# Patient Record
Sex: MALE
[De-identification: names, ages, dates, MRNs, and addresses within clinical notes are randomized per-mention and may not be internally consistent; named-entity substitution may affect disease eponyms.]

---

## 2022-09-22 ENCOUNTER — HOSPITAL ENCOUNTER (INPATIENT)
Dept: HOSPITAL 5 - ED | Age: 57
LOS: 3 days | Discharge: HOME | DRG: 247 | End: 2022-09-25
Attending: INTERNAL MEDICINE | Admitting: INTERNAL MEDICINE
Payer: SELF-PAY

## 2022-09-22 DIAGNOSIS — I21.3: Primary | ICD-10-CM

## 2022-09-22 DIAGNOSIS — D75.1: ICD-10-CM

## 2022-09-22 DIAGNOSIS — E66.01: ICD-10-CM

## 2022-09-22 DIAGNOSIS — F17.210: ICD-10-CM

## 2022-09-22 DIAGNOSIS — R31.9: ICD-10-CM

## 2022-09-22 DIAGNOSIS — E66.9: ICD-10-CM

## 2022-09-22 DIAGNOSIS — E11.65: ICD-10-CM

## 2022-09-22 LAB
ALBUMIN SERPL-MCNC: 4.7 G/DL (ref 3.9–5)
ALT SERPL-CCNC: 44 UNITS/L (ref 7–56)
BASOPHILS # (AUTO): 0.1 K/MM3 (ref 0–0.1)
BASOPHILS NFR BLD AUTO: 1 % (ref 0–1.8)
BUN SERPL-MCNC: 19 MG/DL (ref 9–20)
BUN/CREAT SERPL: 17 %
CALCIUM SERPL-MCNC: 10.2 MG/DL (ref 8.4–10.2)
CRP SERPL-MCNC: 0.4 MG/DL (ref 0–1.3)
EOSINOPHIL # BLD AUTO: 0.2 K/MM3 (ref 0–0.4)
EOSINOPHIL NFR BLD AUTO: 1.8 % (ref 0–4.3)
HCT VFR BLD CALC: 47 % (ref 35.5–45.6)
HEMOLYSIS INDEX: 7
HGB BLD-MCNC: 16.9 GM/DL (ref 11.8–15.2)
INR PPP: 1.11 (ref 0.87–1.13)
LYMPHOCYTES # BLD AUTO: 3.2 K/MM3 (ref 1.2–5.4)
LYMPHOCYTES NFR BLD AUTO: 27.6 % (ref 13.4–35)
MCHC RBC AUTO-ENTMCNC: 36 % (ref 32–34)
MCV RBC AUTO: 92 FL (ref 84–94)
MONOCYTES # (AUTO): 1.1 K/MM3 (ref 0–0.8)
MONOCYTES % (AUTO): 9.7 % (ref 0–7.3)
PLATELET # BLD: 207 K/MM3 (ref 140–440)
RBC # BLD AUTO: 5.1 M/MM3 (ref 3.65–5.03)

## 2022-09-22 PROCEDURE — 36415 COLL VENOUS BLD VENIPUNCTURE: CPT

## 2022-09-22 PROCEDURE — 80061 LIPID PANEL: CPT

## 2022-09-22 PROCEDURE — C1894 INTRO/SHEATH, NON-LASER: HCPCS

## 2022-09-22 PROCEDURE — 82550 ASSAY OF CK (CPK): CPT

## 2022-09-22 PROCEDURE — 4A023N7 MEASUREMENT OF CARDIAC SAMPLING AND PRESSURE, LEFT HEART, PERCUTANEOUS APPROACH: ICD-10-PCS | Performed by: INTERNAL MEDICINE

## 2022-09-22 PROCEDURE — 93005 ELECTROCARDIOGRAM TRACING: CPT

## 2022-09-22 PROCEDURE — 93306 TTE W/DOPPLER COMPLETE: CPT

## 2022-09-22 PROCEDURE — 74176 CT ABD & PELVIS W/O CONTRAST: CPT

## 2022-09-22 PROCEDURE — 83735 ASSAY OF MAGNESIUM: CPT

## 2022-09-22 PROCEDURE — C1760 CLOSURE DEV, VASC: HCPCS

## 2022-09-22 PROCEDURE — 85014 HEMATOCRIT: CPT

## 2022-09-22 PROCEDURE — 85610 PROTHROMBIN TIME: CPT

## 2022-09-22 PROCEDURE — 96374 THER/PROPH/DIAG INJ IV PUSH: CPT

## 2022-09-22 PROCEDURE — 83690 ASSAY OF LIPASE: CPT

## 2022-09-22 PROCEDURE — C1725 CATH, TRANSLUMIN NON-LASER: HCPCS

## 2022-09-22 PROCEDURE — C8929 TTE W OR WO FOL WCON,DOPPLER: HCPCS

## 2022-09-22 PROCEDURE — 81001 URINALYSIS AUTO W/SCOPE: CPT

## 2022-09-22 PROCEDURE — 02C03ZZ EXTIRPATION OF MATTER FROM CORONARY ARTERY, ONE ARTERY, PERCUTANEOUS APPROACH: ICD-10-PCS | Performed by: INTERNAL MEDICINE

## 2022-09-22 PROCEDURE — C1757 CATH, THROMBECTOMY/EMBOLECT: HCPCS

## 2022-09-22 PROCEDURE — 80307 DRUG TEST PRSMV CHEM ANLYZR: CPT

## 2022-09-22 PROCEDURE — 84132 ASSAY OF SERUM POTASSIUM: CPT

## 2022-09-22 PROCEDURE — C9606 PERC D-E COR REVASC W AMI S: HCPCS

## 2022-09-22 PROCEDURE — 85027 COMPLETE CBC AUTOMATED: CPT

## 2022-09-22 PROCEDURE — 84100 ASSAY OF PHOSPHORUS: CPT

## 2022-09-22 PROCEDURE — 86140 C-REACTIVE PROTEIN: CPT

## 2022-09-22 PROCEDURE — C1874 STENT, COATED/COV W/DEL SYS: HCPCS

## 2022-09-22 PROCEDURE — C1769 GUIDE WIRE: HCPCS

## 2022-09-22 PROCEDURE — 85025 COMPLETE CBC W/AUTO DIFF WBC: CPT

## 2022-09-22 PROCEDURE — 027135Z DILATION OF CORONARY ARTERY, TWO ARTERIES WITH TWO DRUG-ELUTING INTRALUMINAL DEVICES, PERCUTANEOUS APPROACH: ICD-10-PCS | Performed by: INTERNAL MEDICINE

## 2022-09-22 PROCEDURE — B2111ZZ FLUOROSCOPY OF MULTIPLE CORONARY ARTERIES USING LOW OSMOLAR CONTRAST: ICD-10-PCS | Performed by: INTERNAL MEDICINE

## 2022-09-22 PROCEDURE — 92941 PRQ TRLML REVSC TOT OCCL AMI: CPT

## 2022-09-22 PROCEDURE — 84484 ASSAY OF TROPONIN QUANT: CPT

## 2022-09-22 PROCEDURE — 93454 CORONARY ARTERY ANGIO S&I: CPT

## 2022-09-22 PROCEDURE — 82962 GLUCOSE BLOOD TEST: CPT

## 2022-09-22 PROCEDURE — 99285 EMERGENCY DEPT VISIT HI MDM: CPT

## 2022-09-22 PROCEDURE — 80048 BASIC METABOLIC PNL TOTAL CA: CPT

## 2022-09-22 PROCEDURE — 85018 HEMOGLOBIN: CPT

## 2022-09-22 PROCEDURE — 71045 X-RAY EXAM CHEST 1 VIEW: CPT

## 2022-09-22 PROCEDURE — 99406 BEHAV CHNG SMOKING 3-10 MIN: CPT

## 2022-09-22 PROCEDURE — 85347 COAGULATION TIME ACTIVATED: CPT

## 2022-09-22 PROCEDURE — 80053 COMPREHEN METABOLIC PANEL: CPT

## 2022-09-22 PROCEDURE — 83036 HEMOGLOBIN GLYCOSYLATED A1C: CPT

## 2022-09-22 PROCEDURE — 83880 ASSAY OF NATRIURETIC PEPTIDE: CPT

## 2022-09-22 PROCEDURE — C1887 CATHETER, GUIDING: HCPCS

## 2022-09-22 RX ADMIN — TICAGRELOR SCH MG: 90 TABLET ORAL at 21:40

## 2022-09-22 RX ADMIN — METOPROLOL TARTRATE SCH MG: 50 TABLET, FILM COATED ORAL at 21:42

## 2022-09-22 RX ADMIN — HEPARIN SODIUM ONE UNIT: 1000 INJECTION, SOLUTION INTRAVENOUS; SUBCUTANEOUS at 18:00

## 2022-09-22 RX ADMIN — FAMOTIDINE SCH MG: 20 TABLET ORAL at 21:40

## 2022-09-22 RX ADMIN — HEPARIN SODIUM ONE UNIT: 1000 INJECTION, SOLUTION INTRAVENOUS; SUBCUTANEOUS at 17:50

## 2022-09-22 NOTE — HISTORY AND PHYSICAL REPORT
History of Present Illness


Date of examination: 22


Date of admission: 


2022


Chief complaint: 





Severe chest pain for 1 hour


History of present illness: 


57-year-old  male with no significant past medical history comes into 

the emergency room for severe retrosternal chest pain with radiation to the left

arm.  Associated with nausea and diaphoresis.  In the emergency room patient 

passed out.  EKG showed acute inferior wall STEMI.  Code STEMI was activated and

patient was sent to the Cath Lab immediately.  Formerly [facility], [facility] was informed .  

Patient had cardiac cath immediately and angioplasty.  Cardiac cath found 

occlusion of the right coronary artery and its proximal segment.  Stenting was 

performed and reestablished CHANDU-3 flow.  Patient has never seen a PCP in the 

last 10 years.  No significant past medical history.  Heavy smoker.  Does not 

want NicoDerm patch.  Patient being admitted to ICU for further care.  Stable 

post.  Asymptomatic after cardiac cath and angioplasty.  Patient apparently had 

arrhythmias and was defibrillated once in the Cath Lab.


Patient initiated on Aggrastat aspirin IV fluids ACE inhibitor's and beta-

blockers.











Heart Score





- HEART Score


History: Highly suspicious


EKG: Significant ST-depression


Age: 45-65


Risk factors: 1-2 risk factors


Troponin: < normal limit


HEART Score: 6











Past History


Past Medical History: No medical history


Past Surgical History: No surgical history


Social history: lives with family, smoking, full code, other (Alcohol socially.)


Family history: hypertension





Medications and Allergies


                                    Allergies











Allergy/AdvReac Type Severity Reaction Status Date / Time


 


No Known Allergies Allergy   Verified 22 17:45











                                Home Medications











 Medication  Instructions  Recorded  Confirmed  Last Taken  Type


 


No Known Home Medications [No  22 Unknown History





Reported Home Medications]     











Active Meds: 


Active Medications





Sodium Chloride (Nacl 0.9% 1000 Ml)  1,000 mls @ 999 mls/hr IV BOLUS ONE


   Stop: 22 18:05


   Last Admin: 22 17:17 Dose:  999 mls/hr


   


NORepinephrine/NS 8 MG-250 ML (Norepinephrine/Ns 8 Mg-250 Ml (Double Conc))  8 

mg in 250 mls @ 23.814 mls/hr IV TITRATE MAURICE; Protocol


   Last Admin: 22 17:23 Dose:  0.2 mcg/kg/min, 47.627 mls/hr


   











Review of Systems


All systems: negative


Cardiovascular: chest pain, syncope





Exam





- Constitutional


Vitals: 


                                        











Temp Pulse Resp BP Pulse Ox


 


    56 L  21   98/61    


 


    22 17:26  22 17:26  22 17:26   











General appearance: Present: mild distress, well-nourished





- EENT


Eyes: Present: PERRL


ENT: hearing intact, clear oral mucosa





- Neck


Neck: Present: supple, normal ROM





- Respiratory


Respiratory effort: normal


Respiratory: bilateral: CTA





- Cardiovascular


Heart rate: 78


Rhythm: regular


Heart Sounds: Present: S1 & S2.  Absent: rub, click





- Extremities


Extremities: no ischemia, pulses intact, pulses symmetrical, No edema


Peripheral Pulses: within normal limits





- Abdominal


General gastrointestinal: Present: soft, non-tender, non-distended, normal bowel

sounds


Male genitourinary: Present: normal





- Integumentary


Integumentary: Present: clear, warm, dry





- Musculoskeletal


Musculoskeletal: gait normal, strength equal bilaterally





- Psychiatric


Psychiatric: appropriate mood/affect, intact judgment & insight





- Neurologic


Neurologic: CNII-XII intact, moves all extremities





- Allied Health


Allied health notes reviewed: nursing, case management





HEART Score





- HEART Score


EKG: Significant ST-depression


Age: 45-65


Risk factors: 1-2 risk factors


Troponin: 


                                        











Troponin T  < 0.010 ng/mL (0.00-0.029)   22  16:59    











Troponin: < normal limit





- Critical Actions


Critical Actions: 4-6 pts:12-16.6% risk of adverse cardiac event. Should be ad

mitted





Results





- Labs


CBC & Chem 7: 


                                 22 04:15





                                 22 04:00


Labs: 


                             Laboratory Last Values











WBC  11.4 K/mm3 (4.5-11.0)  H  22  16:59    


 


RBC  5.10 M/mm3 (3.65-5.03)  H  22  16:59    


 


Hgb  16.9 gm/dl (11.8-15.2)  H  22  16:59    


 


Hct  47.0 % (35.5-45.6)  H  22  16:59    


 


MCV  92 fl (84-94)   22  16:59    


 


MCH  33 pg (28-32)  H  22  16:59    


 


MCHC  36 % (32-34)  H  22  16:59    


 


RDW  12.9 % (13.2-15.2)  L  22  16:59    


 


Plt Count  207 K/mm3 (140-440)   22  16:59    


 


Lymph % (Auto)  27.6 % (13.4-35.0)   22  16:59    


 


Mono % (Auto)  9.7 % (0.0-7.3)  H  22  16:59    


 


Eos % (Auto)  1.8 % (0.0-4.3)   22  16:59    


 


Baso % (Auto)  1.0 % (0.0-1.8)   22  16:59    


 


Lymph # (Auto)  3.2 K/mm3 (1.2-5.4)   22  16:59    


 


Mono # (Auto)  1.1 K/mm3 (0.0-0.8)  H  22  16:59    


 


Eos # (Auto)  0.2 K/mm3 (0.0-0.4)   22  16:59    


 


Baso # (Auto)  0.1 K/mm3 (0.0-0.1)   22  16:59    


 


Seg Neutrophils %  59.9 % (40.0-70.0)   22  16:59    


 


Seg Neutrophils #  6.8 K/mm3 (1.8-7.7)   22  16:59    


 


Sodium  139 mmol/L (137-145)   22  16:59    


 


Potassium  4.1 mmol/L (3.6-5.0)   22  16:59    


 


Chloride  104.1 mmol/L ()   22  16:59    


 


Carbon Dioxide  18 mmol/L (22-30)  L  22  16:59    


 


Anion Gap  21 mmol/L  22  16:59    


 


BUN  19 mg/dL (9-20)   22  16:59    


 


Creatinine  1.1 mg/dL (0.8-1.3)   22  16:59    


 


Estimated GFR  > 60 ml/min  22  16:59    


 


BUN/Creatinine Ratio  17 %  22  16:59    


 


Glucose  218 mg/dL ()  H  22  16:59    


 


POC Glucose  201 mg/dL ()  H  22  16:56    


 


Calcium  10.2 mg/dL (8.4-10.2)   22  16:59    


 


Total Bilirubin  0.90 mg/dL (0.1-1.2)   22  16:59    


 


AST  42 units/L (5-40)  H  22  16:59    


 


ALT  44 units/L (7-56)   22  16:59    


 


Alkaline Phosphatase  93 units/L ()   22  16:59    


 


Total Creatine Kinase  214 units/L ()  H  22  16:59    


 


Troponin T  < 0.010 ng/mL (0.00-0.029)   22  16:59    


 


C-Reactive Protein  0.40 mg/dL (0.00-1.30)   22  16:59    


 


NT-Pro-B Natriuret Pep  57.84 pg/mL (0-900)   22  16:59    


 


Total Protein  7.0 g/dL (6.3-8.2)   22  16:59    


 


Albumin  4.7 g/dL (3.9-5)   22  16:59    


 


Albumin/Globulin Ratio  2.0 %  22  16:59    


 


Lipase  53 units/L (13-60)   22  16:59    








                                    Short CBC











  22 Range/Units





  16:59 04:15 


 


WBC  11.4 H  11.8 H  (4.5-11.0)  K/mm3


 


Hgb  16.9 H  14.8  (11.8-15.2)  gm/dl


 


Hct  47.0 H  42.3  (35.5-45.6)  %


 


Plt Count  207  166  (140-440)  K/mm3








                                       BMP











  22





  16:59 04:00


 


Sodium  139 


 


Potassium  4.1  4.4


 


Chloride  104.1 


 


Carbon Dioxide  18 L 


 


BUN  19 


 


Creatinine  1.1 


 


Glucose  218 H 


 


Calcium  10.2 








                                 Cardiac Enzymes











  22 Range/Units





  16:59 16:59 


 


Total Creatine Kinase   214 H  ()  units/L


 


Troponin T  < 0.010   (0.00-0.029)  ng/mL








                                 Liver Function











  22 Range/Units





  16:59 


 


Total Bilirubin  0.90  (0.1-1.2)  mg/dL


 


AST  42 H  (5-40)  units/L


 


ALT  44  (7-56)  units/L


 


Alkaline Phosphatase  93  ()  units/L


 


Albumin  4.7  (3.9-5)  g/dL














- Imaging and Cardiology


EKG: report reviewed (ST elevation MI in inferior leads.  Heart rate of 76)


Chest x-ray: report reviewed (Mild interstitial pulmonary edema)





Assessment and Plan


Assessment and plan: 


Critical care statement


The high probability OF a clinically significant sudden or life-threatening 

deterioration of the cardiorespiratory system and endocrine system required my 

full and direct attention, intervention and postoperative management.  The 

aggregate critical care time was 40 minutes.  The time is in addition to time 

spent performing reported procedures but includes the followin: Data review and interpretation


2: Patient assessment and monitoring of vital signs


3: Documentation


4:: Medication orders and management





Advance Directives: Yes (Full code)


VTE prophylaxis?: Chemical


Plan of care discussed with patient/family: Yes





- Patient Problems


(1) STEMI (ST elevation myocardial infarction)


Current Visit: Yes   Status: Acute   


Qualifiers: 


   Involved coronary artery: right coronary artery   Qualified Code(s): I21.11 -

ST elevation (STEMI) myocardial infarction involving right coronary artery   


Plan to address problem: 


Patient had angioplasty and stent placed.


Post stent patient doing well.


Patient on Aggrastat, IV fluids, aspirin, lisinopril and metoprolol.


Admission to ICU.








(2) Syncope and collapse


Current Visit: Yes   Status: Acute   


Plan to address problem: 


Secondary to acute MI


Carotid duplex scan requested


Patient also due for echocardiogram for ejection fraction, wall motion 

abnormalities and valvular abnormalities








(3) Obesity (BMI 35.0-39.9 without comorbidity)


Current Visit: Yes   Status: Chronic   


Plan to address problem: 


Patient counseled





Primary team to give a referral to bariatric surgery at the time of discharge


Also lifestyle changes








(4) Polycythemia due to fall in plasma volume


Current Visit: Yes   Status: Acute   


Plan to address problem: 


IV fluids for now








(5) Hyperglycemia


Current Visit: Yes   Status: Acute   


Plan to address problem: 


Patient may have type 2 diabetes


Accu-Cheks and coverage


A1c to be checked








(6) Nicotine dependence


Current Visit: Yes   Status: Chronic   


Qualifiers: 


   Nicotine product type: cigarettes 


Plan to address problem: 


Patient was counseled about cigarette smoking cessation


NicoDerm patches offered--- refused








(7) DVT prophylaxis


Current Visit: Yes   Status: Acute   


Plan to address problem: 


On SCDs and GI prophylaxis

## 2022-09-22 NOTE — CARDIAC CATHERIZATION REPORT
DATE OF SERVICE: 09/22/2022



CARDIAC CATHETERIZATION AND CORONARY ANGIOPLASTY REPORT



REASON FOR PROCEDURE:  The patient is a 57-year-old man who presented to the 

hospital with chest pain, ECG consistent with an acute inferior ST elevation 

myocardial infarction.  Emergency cardiac catheterization protocol was 

activated.



PROCEDURES:

1.  Left heart catheterization.

2.  Selective left and right coronary angiography.

3.  Coronary angioplasty and stenting of the right coronary artery.

4.  Sedation time.



DESCRIPTION OF PROCEDURE:  The patient was prepped and draped in a sterile 

fashion under emergency protocol.  The right femoral artery was entered using 

Seldinger technique followed by placement of a 6-Austrian sheath.  Angiography of 

the left coronary artery was performed using a #4 left Nolberto catheter.  We 

then exchanged for a #4 right Nolberto guiding catheter and advanced to the right

coronary ostium.  Right coronary angiography was performed.  The angiograms were

reviewed.



CORONARY ANGIOGRAPHY:  The left main coronary artery contained a 10% narrowing 

in its distal segment with a suggestion of vessel calcification and plaque in 

this segment.



Following this, there was diffuse atherosclerosis of a relatively small caliber 

LAD and diagonal system.  There was a 40-50% luminal stenosis of the mid LAD.  

Before that, a prominent, first diagonal branch contained a 30% stenosis in its 

proximal segment.



The circumflex artery contained mild irregularities in its proximal AV groove 

segment.  The large terminal obtuse marginal contained a long 50% stenosis.



The right coronary artery was a large caliber dominant vessel.  This vessel was 

occluded in its proximal segment.  This was the infarct related lesion.



CORONARY ANGIOPLASTY:  We proceeded with immediate primary intervention to the 

right coronary artery occlusion.  We used a 0.014 inch  50 guidewire, 

successfully penetrating the completely occluded vessel, following wire 

placement in the distal vessel and the vessel was predilated using a 3.5 mm 

balloon catheter.



Predilatation reestablished CHANDU 2-3 flow, revealing an ulcerated plaque at the 

primary lesion and a long segment of intracoronary thrombus within the mid and 

distal segments of the vessel.



We then deployed 2 sequential 5.0 mm drug-eluting stents in the proximal to mid 

vessel covering the entire primary lesion.  Following stenting, CHANDU 3 flow was 

reestablished, with zero residual stenosis at the primary lesion.  There was 

still evidence of significant filling defect beyond the primary lesion 

consistent with extensive intracoronary thrombus.



We then obtained an [place] catheter, which was transitioned into the vessel and 

performed 3 passes with successful removal of an extensive amount of 

intraluminal thrombus.  Following [place] catheter thrombectomy, there was no 

residual thrombus evident on subsequent angiograms.  Instead, we then discovered

2 sequential 70-80% secondary lesions in the distal vessel, located between the 

origin of the acute margin and the right posterior descending branch.



We then proceeded with additional primary stenting of the secondary lesions in 

the distal vessel this time using 2 sequential overlapping 4.0 mm drug-eluting 

stents to cover the longer lesional segment.  Following the additional stenting,

there was an excellent angiographic result at the secondary site, zero residual 

stenosis and CHANDU 3 flow was maintained down the vessel.



Transient ventricular fibrillation:  After the initial balloon inflation on 

restoration of CHANDU-3 flow, the patient demonstrated ventricular fibrillation, 

which was promptly terminated using 200 joules of cardioversion.  He was also 

given 300 mg intravenous amiodarone, with no further ventricular ectopy or 

malignant tachyarrhythmia.



Following the procedure, patient was chest pain free, hemodynamically stable in 

a stable sinus rhythm, ST segments inferiorly were down by more than 50% from 

the original baseline, and the right femoral arterial sheath was removed and 

hemostasis achieved successfully using an Angio-Seal device.  The patient was 

then returned to the postprocedure unit in stable condition to be admitted to 

the CCU.



CONCLUSION:

1.  The patient admitted with an acute inferolateral ST elevation myocardial 

infarction.

2.  Emergency cardiac catheterization protocol was activated.

3.  A 100% occlusion of the proximal right coronary artery was infarct related 

lesion.

4.  Successful primary angioplasty and stenting of the right coronary artery 

with deployment of 5.0 mm drug-eluting stents at the primary lesion, an 

additional 4.0 mm drug-eluting stents deployed to secondary lesions in the 

distal AV groove vessel.

5.  [place] catheter thrombectomy was done successfully to reduce the burden of 

extensive intraluminal thrombus.



RECOMMENDATIONS:  The patient will be admitted on guideline directed medical 

therapy including dual oral antiplatelet therapy with aspirin and Brilinta.







DD: 09/22/2022 06:47 PM

DT: 09/22/2022 07:48 PM

TID: [id number] RECEIPT: [id number]

CA/DSCHARISMA

## 2022-09-22 NOTE — CONSULTATION
History of Present Illness


Consult date: 09/22/22


Consult reason: chest pain, other (Acute STEMI)


History of present illness: 





Patient is a 57-year-old man who presented to the hospital with chest pain, ECG 

was an acute inferior ST elevation myocardial infarction.  Emergency cardiac 

catheterization protocol was activated, cardiac catheterization found complete 

occlusion of the right coronary artery and its proximal segment.  We performed 

successful primary angioplasty and stenting, reestablished CHANDU-3 flow.  

Procedure details in procedure report.  Postprocedure, he is chest pain-free, 

hemodynamically stable and in sinus rhythm.





Patient reports no prior cardiac history.  He states that he has not seen a 

doctor in over 10 years.  He is a heavy tobacco smoker.





Past History


Past Medical History: No medical history, other (Tobacco abuse)





Medications and Allergies


                                    Allergies











Allergy/AdvReac Type Severity Reaction Status Date / Time


 


No Known Allergies Allergy   Verified 09/22/22 17:45











Active Meds: 


Active Medications





Acetaminophen (Acetaminophen 325 Mg Tab)  650 mg PO Q4H PRN


   PRN Reason: Pain MILD(1-3)/Fever >100.5/HA


Aspirin (Aspirin Ec 325 Mg Tab)  81 mg PO QDAY MAURICE


Atorvastatin Calcium (Atorvastatin 40 Mg Tab)  80 mg PO QHS MAURICE


Famotidine (Famotidine 20 Mg Tab)  20 mg PO BID MAURICE


Hydromorphone HCl (Hydromorphone 0.5 Mg/0.5 Ml Inj)  0.5 mg IV Q3H PRN


   PRN Reason: Pain , Severe (7-10)


Sodium Chloride (Nacl 0.9% 1000 Ml)  1,000 mls @ 75 mls/hr IV AS DIRECT MAURICE


   Stop: 09/23/22 08:14


Tirofiban/Sodium Chloride (Aggrastat Drip (12.5 Mg/250 Ml))  12,500 mcg in 250 

mls @ 0 mls/hr IV AS DIRECT MAURICE; Protocol


   Stop: 09/23/22 12:59


Lisinopril (Lisinopril 5 Mg Tab)  5 mg PO QDAY MAURICE


Metoprolol Tartrate (Metoprolol Tartrate 50 Mg Tab)  50 mg PO BID MAURICE


Morphine Sulfate (Morphine 2 Mg/1 Ml Inj)  2 mg IV Q4H PRN


   PRN Reason: Pain, Moderate (4-6)


Ondansetron HCl (Ondansetron 4 Mg/2 Ml Inj)  4 mg IV Q8H PRN


   PRN Reason: Nausea And Vomiting


Oxycodone/Acetaminophen (Oxycodone /Acetaminophen 5-325mg Tab)  1 tab PO Q6H PRN


   PRN Reason: Pain, Moderate (4-6)


Sodium Chloride (Sodium Chloride 0.9% 10 Ml Flush Syringe)  10 ml IV BID [facility]


Sodium Chloride (Sodium Chloride 0.9% 10 Ml Flush Syringe)  10 ml IV PRN PRN


   PRN Reason: LINE FLUSH


Ticagrelor (Ticagrelor 90 Mg Tab)  90 mg PO BID [facility]











Review of Systems


Cardiovascular: chest pain, shortness of breath, no orthopnea, no palpitations, 

no rapid/irregular heart beat, no edema, no syncope, no lightheadedness





Physical Examination


                                   Vital Signs











Pulse BP


 


 60   80/45 


 


 09/22/22 17:17  09/22/22 17:17











General appearance: no acute distress


HEENT: Positive: PERRL


Neck: Positive: neck supple


Cardiac: Positive: Reg Rate and Rhythm


Lungs: Positive: Decreased Breath Sounds


Neuro: Positive: Grossly Intact


Abdomen: Positive: Soft


Male genitourinary: Positive: deferred


Skin: Positive: Clear


Extremities: Absent: edema





Results





                                 09/22/22 16:59





                                 09/22/22 16:59


                                 Cardiac Enzymes











  09/22/22 Range/Units





  16:59 


 


AST  42 H  (5-40)  units/L








                                       CBC











  09/22/22 Range/Units





  16:59 


 


WBC  11.4 H  (4.5-11.0)  K/mm3


 


RBC  5.10 H  (3.65-5.03)  M/mm3


 


Hgb  16.9 H  (11.8-15.2)  gm/dl


 


Hct  47.0 H  (35.5-45.6)  %


 


Plt Count  207  (140-440)  K/mm3


 


Lymph # (Auto)  3.2  (1.2-5.4)  K/mm3


 


Mono # (Auto)  1.1 H  (0.0-0.8)  K/mm3


 


Eos # (Auto)  0.2  (0.0-0.4)  K/mm3


 


Baso # (Auto)  0.1  (0.0-0.1)  K/mm3








                          Comprehensive Metabolic Panel











  09/22/22 Range/Units





  16:59 


 


Sodium  139  (137-145)  mmol/L


 


Potassium  4.1  (3.6-5.0)  mmol/L


 


Chloride  104.1  ()  mmol/L


 


Carbon Dioxide  18 L  (22-30)  mmol/L


 


BUN  19  (9-20)  mg/dL


 


Creatinine  1.1  (0.8-1.3)  mg/dL


 


Glucose  218 H  ()  mg/dL


 


Calcium  10.2  (8.4-10.2)  mg/dL


 


AST  42 H  (5-40)  units/L


 


ALT  44  (7-56)  units/L


 


Alkaline Phosphatase  93  ()  units/L


 


Total Protein  7.0  (6.3-8.2)  g/dL


 


Albumin  4.7  (3.9-5)  g/dL














EKG interpretations





- Telemetry


EKG Rhythm: Sinus Rhythm (With acute inferior ST elevation)





Assessment and Plan





- Patient Problems


(1) STEMI (ST elevation myocardial infarction)


Status: Acute   


Plan to address problem: 


Patient is status post emergency cardiac catheterization with primary 

angioplasty and stenting of the occluded infarct right coronary vessel.  Admit 

to CCU for post MI supportive management, dual oral antiplatelet therapy with 

baby aspirin and Brilinta.  Continue other guideline directed medical therapy.  

Echocardiogram has been ordered for left ventricular function assessment.

## 2022-09-22 NOTE — EMERGENCY DEPARTMENT REPORT
ED Chest Pain HPI





- General


Chief Complaint: Chest Pain


Stated Complaint: CHEST PAIN


PUI?: No


Time Seen by Provider: 09/22/22 17:04


Source: patient


Mode of arrival: Ambulatory


Limitations: Physical Limitation





- History of Present Illness


Initial Comments: 





ptis 57 years old with no past medical problems , he was at work when started 

having chest pain central going to his left arm with nasea and abdominal pain he

stopped by the hospital and passed out while checking in moved right away to  

main ER and monitor showed Stemi 


-: Gradual, hour(s)


Onset: during rest


Pain Location: substernal, left chest


Pain Radiation: LUE


Severity: severe


Severity scale (0 -10): 10


Quality: heaviness


Consistency: constant


Improves With: nothing


Worsens With: nothing


re: nausea, diaphoresis


Treatments Prior to Arrival: none





- Related Data


On Oral Contraceptives: No


                                    Allergies











Allergy/AdvReac Type Severity Reaction Status Date / Time


 


No Known Allergies Allergy   Verified 09/22/22 17:45














Heart Score





- HEART Score


History: Highly suspicious


EKG: Significant ST-depression


Age: 45-65


Risk factors: 1-2 risk factors


Troponin: < normal limit


HEART Score: 6





- EKG Read Time


Time EKG Completed: 17:30


EKG Read Time: 17:30





- Critical Actions


Critical Actions: 4-6 pts:12-16.6% risk of adverse cardiac event. Should be 

admitted





ED Review of Systems


ROS: 


Stated complaint: CHEST PAIN


Other details as noted in HPI








ED Physical Exam





- General


Limitations: Physical Limitation


General appearance: anxious, in distress, obese





- Head


Head exam: Present: atraumatic, normocephalic





- Eye


Eye exam: Present: normal appearance





- ENT


ENT exam: Present: mucous membranes moist





- Neck


Neck exam: Present: normal inspection





- Respiratory


Respiratory exam: Present: normal lung sounds bilaterally.  Absent: respiratory 

distress





- Cardiovascular


Cardiovascular Exam: Present: normal rhythm, bradycardia.  Absent: systolic 

murmur, diastolic murmur, rubs, gallop





- GI/Abdominal


GI/Abdominal exam: Present: soft, normal bowel sounds





- Rectal


Rectal exam: Present: deferred





- Extremities Exam


Extremities exam: Present: normal inspection





- Back Exam


Back exam: Present: normal inspection





- Neurological Exam


Neurological exam: Present: alert, oriented X3





- Psychiatric


Psychiatric exam: Present: normal affect, normal mood





- Skin


Skin exam: Present: warm, intact, normal color, diaphoretic.  Absent: rash





ED Course





                                   Vital Signs











  09/22/22 09/22/22 09/22/22





  17:17 17:23 17:26


 


Pulse Rate 60 57 L 56 L


 


Respiratory   21





Rate   


 


Blood Pressure 80/45 89/54 98/61





[Left]   














ED Medical Decision Making





- Lab Data


Result diagrams: 


                                 09/22/22 16:59





                                 09/22/22 16:59





- EKG Data


-: EKG Interpreted by Me


EKG shows normal: sinus rhythm





- EKG Data


Interpretation: acute MI


Critical care attestation.: 


If time is entered above; I have spent that time in minutes in the direct care 

of this critically ill patient, excluding procedure time.








ED Disposition


Clinical Impression: 


 STEMI (ST elevation myocardial infarction)





Disposition: 09 ADMITTED AS INPATIENT


Is pt being admited?: Yes


Does the pt Need Aspirin: Yes


Condition: Critical

## 2022-09-23 LAB
ALBUMIN SERPL-MCNC: 4 G/DL (ref 3.9–5)
ALT SERPL-CCNC: 55 UNITS/L (ref 7–56)
BASOPHILS # (AUTO): 0.1 K/MM3 (ref 0–0.1)
BASOPHILS NFR BLD AUTO: 0.5 % (ref 0–1.8)
BUN SERPL-MCNC: 21 MG/DL (ref 9–20)
BUN/CREAT SERPL: 26 %
CALCIUM SERPL-MCNC: 9 MG/DL (ref 8.4–10.2)
EOSINOPHIL # BLD AUTO: 0.1 K/MM3 (ref 0–0.4)
EOSINOPHIL NFR BLD AUTO: 0.6 % (ref 0–4.3)
HCT VFR BLD CALC: 42.3 % (ref 35.5–45.6)
HDLC SERPL-MCNC: 56 MG/DL (ref 40–59)
HEMOLYSIS INDEX: 4
HGB BLD-MCNC: 14.8 GM/DL (ref 11.8–15.2)
LYMPHOCYTES # BLD AUTO: 2.3 K/MM3 (ref 1.2–5.4)
LYMPHOCYTES NFR BLD AUTO: 19.7 % (ref 13.4–35)
MCHC RBC AUTO-ENTMCNC: 35 % (ref 32–34)
MCV RBC AUTO: 93 FL (ref 84–94)
MONOCYTES # (AUTO): 1.3 K/MM3 (ref 0–0.8)
MONOCYTES % (AUTO): 10.8 % (ref 0–7.3)
MUCOUS THREADS #/AREA URNS HPF: (no result) /HPF
PLATELET # BLD: 166 K/MM3 (ref 140–440)
RBC # BLD AUTO: 4.53 M/MM3 (ref 3.65–5.03)
RBC #/AREA URNS HPF: < 1 /HPF (ref 0–6)
WBC #/AREA URNS HPF: < 1 /HPF (ref 0–6)

## 2022-09-23 RX ADMIN — INSULIN LISPRO SCH UNIT: 100 INJECTION, SOLUTION INTRAVENOUS; SUBCUTANEOUS at 08:12

## 2022-09-23 RX ADMIN — FAMOTIDINE SCH MG: 20 TABLET ORAL at 09:26

## 2022-09-23 RX ADMIN — INSULIN LISPRO SCH UNIT: 100 INJECTION, SOLUTION INTRAVENOUS; SUBCUTANEOUS at 22:03

## 2022-09-23 RX ADMIN — INSULIN LISPRO SCH UNIT: 100 INJECTION, SOLUTION INTRAVENOUS; SUBCUTANEOUS at 12:26

## 2022-09-23 RX ADMIN — TICAGRELOR SCH MG: 90 TABLET ORAL at 09:26

## 2022-09-23 RX ADMIN — Medication SCH: at 08:55

## 2022-09-23 RX ADMIN — Medication SCH ML: at 09:27

## 2022-09-23 RX ADMIN — METOPROLOL TARTRATE SCH MG: 50 TABLET, FILM COATED ORAL at 09:26

## 2022-09-23 RX ADMIN — Medication SCH ML: at 22:03

## 2022-09-23 RX ADMIN — TICAGRELOR SCH MG: 90 TABLET ORAL at 22:03

## 2022-09-23 RX ADMIN — LISINOPRIL SCH MG: 5 TABLET ORAL at 09:26

## 2022-09-23 RX ADMIN — METOPROLOL TARTRATE SCH MG: 25 TABLET, FILM COATED ORAL at 22:03

## 2022-09-23 RX ADMIN — ASPIRIN SCH MG: 81 TABLET, COATED ORAL at 09:26

## 2022-09-23 RX ADMIN — FAMOTIDINE SCH MG: 20 TABLET ORAL at 22:03

## 2022-09-23 RX ADMIN — INSULIN LISPRO SCH UNIT: 100 INJECTION, SOLUTION INTRAVENOUS; SUBCUTANEOUS at 17:19

## 2022-09-23 NOTE — PROGRESS NOTE
Assessment and Plan





- Patient Problems


(1) STEMI (ST elevation myocardial infarction)


Current Visit: No   Status: Inactive   


Plan to address problem: 


Patient is status post emergency cardiac catheterization with primary 

angioplasty and stenting of the occluded infarct right coronary vessel.  Admit 

to CCU for post MI supportive management, dual oral antiplatelet therapy with 

baby aspirin and Brilinta.  Continue other guideline directed medical therapy.  

Echocardiogram has been ordered for left ventricular function assessment.








Subjective


Date of service: 09/23/22


Principal diagnosis: Acute inferior wall STEMI


Interval history: 





Patient is comfortable, no further chest pain, no shortness of breath following 

primary intervention for acute inferior STEMI.  His right groin cath site is 

well-healed, no ecchymosis and no hematoma.  Normal right leg pulses.  On 

telemetry monitor he is in a stable sinus rhythm, ST segments have pseudo 

normalized, and blood pressure is stable.  On exam, there is no murmur.





Objective


                                   Vital Signs











  Temp Pulse Resp BP BP Pulse Ox


 


 09/23/22 13:01   71    


 


 09/23/22 13:00   68  11 L  86/48   97


 


 09/23/22 12:50   68  13  98/54   97


 


 09/23/22 12:40   65  17  98/54   99


 


 09/23/22 12:30   68  18  98/54   97


 


 09/23/22 12:20   67  18  98/54   97


 


 09/23/22 12:10   72  17  98/54   93


 


 09/23/22 12:00  98.8 F  66  10 L  102/65   97


 


 09/23/22 11:50   64  18  102/65   95


 


 09/23/22 11:40   62  23  102/65   96


 


 09/23/22 11:30   68  12  102/65   96


 


 09/23/22 11:20   64  14  102/65   97


 


 09/23/22 11:10   65  19  102/65   95


 


 09/23/22 11:00   63  9 L  102/65   97


 


 09/23/22 10:50   64  15  110/66   96


 


 09/23/22 10:40   66  11 L  110/66   95


 


 09/23/22 10:30   64  15  110/66   98


 


 09/23/22 10:20   64  18  110/66   97


 


 09/23/22 10:10   65  19  110/66   94


 


 09/23/22 10:00   65  13  110/66   96


 


 09/23/22 09:50   69  13  113/72   96


 


 09/23/22 09:40   68  11 L  113/72   99


 


 09/23/22 09:30   68  9 L  113/72   99


 


 09/23/22 09:26   65   113/72  


 


 09/23/22 09:20   70  13  113/72   97


 


 09/23/22 09:10   67  23  113/72   95


 


 09/23/22 09:00   65  16  113/72   98


 


 09/23/22 08:50   64  21  110/74   97


 


 09/23/22 08:40   65  26 H  110/74   93


 


 09/23/22 08:30   65  11 L  110/74   94


 


 09/23/22 08:21  96.8 F L     


 


 09/23/22 08:20   68  23  110/74   95


 


 09/23/22 08:10   69  25 H  110/74   92


 


 09/23/22 08:00   66  21  110/74   95


 


 09/23/22 07:50   68  13  117/76   94


 


 09/23/22 07:40   63  21  117/76   93


 


 09/23/22 07:30   71  7 L  117/76   96


 


 09/23/22 07:20   68  25 H  117/76   98


 


 09/23/22 07:10   69  9 L  117/76   96


 


 09/23/22 07:00   67  11 L  117/76   97


 


 09/23/22 06:50   64  14  111/73   92


 


 09/23/22 06:40   65  11 L  111/73   94


 


 09/23/22 06:32    22    98


 


 09/23/22 06:30   63  10 L  111/73   90


 


 09/23/22 06:20   71  15  111/73   97


 


 09/23/22 06:10   66  12  111/73   90


 


 09/23/22 06:00   62  17  111/73   86


 


 09/23/22 05:50   64  17  115/76   89


 


 09/23/22 05:40   69  7 L  115/76   96


 


 09/23/22 05:30   67  14  115/76   87


 


 09/23/22 05:20   69  11 L  115/76   95


 


 09/23/22 05:10   62  8 L  115/76   93


 


 09/23/22 05:00   63  18  115/76   98


 


 09/23/22 04:50   62  11 L  107/74   90


 


 09/23/22 04:40   63  10 L  107/74   98


 


 09/23/22 04:30   65  14  107/74   94


 


 09/23/22 04:20   65  14  107/74   99


 


 09/23/22 04:10   65  16  107/74   89


 


 09/23/22 04:02    22    98


 


 09/23/22 04:00   67  12  107/74   98


 


 09/23/22 03:50  99.2 F  67  16  111/70   97


 


 09/23/22 03:40   71  21  111/70   95


 


 09/23/22 03:30   69  21  111/70   87


 


 09/23/22 03:20   72  14  111/70   98


 


 09/23/22 03:10   77  19  111/70   96


 


 09/23/22 03:00   70  13  111/70   93


 


 09/23/22 02:50  99.2 F  73  13  117/75   97


 


 09/23/22 02:40   74  22    95


 


 09/23/22 02:30   82  11 L  117/75   97


 


 09/23/22 02:20   79  14  127/75   96


 


 09/23/22 02:10   79  17  117/75   97


 


 09/23/22 02:00   78  11 L  117/75   97


 


 09/23/22 01:50   83  26 H  127/75   97


 


 09/23/22 01:40   82  26 H  122/77   96


 


 09/23/22 01:30   82  18  122/77   98


 


 09/23/22 01:20   84  22  123/71   96


 


 09/23/22 01:10   80  21  106/72   95


 


 09/23/22 01:00   80  17  106/72   97


 


 09/23/22 00:50   82  21  119/74   98


 


 09/23/22 00:40   81  19  113/74   93


 


 09/23/22 00:35  98.2 F  120 H  20  134/52   98


 


 09/23/22 00:30   81  21  113/74   92


 


 09/23/22 00:20   94 H  20  105/65   97


 


 09/23/22 00:10   91 H  15  110/75   96


 


 09/23/22 00:05  98.2 F  115 H  20    98


 


 09/23/22 00:00  99.2 F  88  13  110/75   97


 


 09/22/22 23:50   93 H  18  110/72   97


 


 09/22/22 23:46   95 H  26 H  110/72   96


 


 09/22/22 23:40   82  22  126/77   96


 


 09/22/22 23:30   83  17  126/77   94


 


 09/22/22 23:20   82  24  130/83   97


 


 09/22/22 23:10   86  19  143/87   95


 


 09/22/22 23:00   83  27 H  143/87   97


 


 09/22/22 22:50   83  29 H  150/90   97


 


 09/22/22 22:40   83  20  143/92   98


 


 09/22/22 22:30   86  14  143/92   98


 


 09/22/22 22:20   82  17  134/90   97


 


 09/22/22 22:10   86  14  159/106   98


 


 09/22/22 22:00   82  13  149/110   97


 


 09/22/22 21:50   83  20  159/106   96


 


 09/22/22 21:42   85   142/11  


 


 09/22/22 21:40   84  16  142/111   98


 


 09/22/22 21:30   86  11 L  142/111   97


 


 09/22/22 21:20   88  13  154/88   96


 


 09/22/22 21:10   88  13  150/97   96


 


 09/22/22 21:08  98.2 F  89  18    98


 


 09/22/22 21:00   84  22  144/95   96


 


 09/22/22 20:53  98.2 F  88    


 


 09/22/22 20:50   83  12  150/97   96


 


 09/22/22 20:48  98.2 F  114 H  18    98


 


 09/22/22 20:40   88  26 H  158/99   94


 


 09/22/22 20:30   83  12  142/93   98


 


 09/22/22 20:20   89  18  142/93   94


 


 09/22/22 20:10   84  12  147/99   95


 


 09/22/22 20:08  98.2 F  84  18   


 


 09/22/22 20:00   82  7 L  143/94   96


 


 09/22/22 19:53  98.2 F  84  18    98


 


 09/22/22 19:50   83  10 L  143/94   97


 


 09/22/22 19:48  98.2 F  85  22  147/99   98


 


 09/22/22 19:40   81  11 L    96


 


 09/22/22 17:26   56 L  21   98/61 


 


 09/22/22 17:23   57 L    89/54 


 


 09/22/22 17:17   60    80/45 














- Physical Examination


General: No Apparent Distress


HEENT: Positive: PERRL


Neck: Positive: neck supple


Cardiac: Positive: Reg Rate and Rhythm


Lungs: Positive: Decreased Breath Sounds


Neuro: Positive: Grossly Intact


Abdomen: Positive: Soft


Skin: Positive: Clear


Extremities: Absent: edema





- Labs and Meds


                                 Cardiac Enzymes











  09/22/22 09/23/22 Range/Units





  16:59 04:15 


 


AST  42 H  209 H  (5-40)  units/L








                                   Coagulation











  09/22/22 Range/Units





  16:59 


 


PT  15.6 H  (12.2-14.9)  Sec.


 


INR  1.11  (0.87-1.13)  








                                     Lipids











  09/23/22 Range/Units





  04:15 


 


Triglycerides  126  (2-149)  mg/dL


 


Cholesterol  166  ()  mg/dL


 


HDL Cholesterol  56  (40-59)  mg/dL


 


Cholesterol/HDL Ratio  2.96  %








                                       CBC











  09/22/22 09/23/22 Range/Units





  16:59 04:15 


 


WBC  11.4 H  11.8 H  (4.5-11.0)  K/mm3


 


RBC  5.10 H  4.53  (3.65-5.03)  M/mm3


 


Hgb  16.9 H  14.8  (11.8-15.2)  gm/dl


 


Hct  47.0 H  42.3  (35.5-45.6)  %


 


Plt Count  207  166  (140-440)  K/mm3


 


Lymph # (Auto)  3.2  2.3  (1.2-5.4)  K/mm3


 


Mono # (Auto)  1.1 H  1.3 H  (0.0-0.8)  K/mm3


 


Eos # (Auto)  0.2  0.1  (0.0-0.4)  K/mm3


 


Baso # (Auto)  0.1  0.1  (0.0-0.1)  K/mm3








                          Comprehensive Metabolic Panel











  09/22/22 09/23/22 09/23/22 Range/Units





  16:59 04:00 04:15 


 


Sodium  139   138  (137-145)  mmol/L


 


Potassium  4.1  4.4  4.7  (3.6-5.0)  mmol/L


 


Chloride  104.1   104.8  ()  mmol/L


 


Carbon Dioxide  18 L   23  (22-30)  mmol/L


 


BUN  19   21 H  (9-20)  mg/dL


 


Creatinine  1.1   0.8  (0.8-1.3)  mg/dL


 


Glucose  218 H   214 H  ()  mg/dL


 


Calcium  10.2   9.0  (8.4-10.2)  mg/dL


 


AST  42 H   209 H  (5-40)  units/L


 


ALT  44   55  (7-56)  units/L


 


Alkaline Phosphatase  93   69  ()  units/L


 


Total Protein  7.0   5.8 L  (6.3-8.2)  g/dL


 


Albumin  4.7   4.0  (3.9-5)  g/dL














- Imaging and Cardiology


EKG: report reviewed (ST elevation MI in inferior leads.  Heart rate of 76)

## 2022-09-23 NOTE — PROGRESS NOTE
Assessment and Plan


Assessment and plan: 








This is a 57-year-old male with nicotine abuse (3 packs/day), EtOH abuse (2 

beers per day) admitted with STEMI s/p PCI and stent placement to RCA.  





Neuro: EtOH abuse (2 beers per day)


-[facility] protocol


-Reorientation as needed


-Maintain sleep-wake cycle


-As needed analgesia





Cardiac: STEMI s/p PCI with stent placement to RCA


-Cardiology consulted, appreciate recommendations


-S/p cardiac cath which revealed 100% occlusion of proximal RCA


-S/p stent placement to RCA


-Blood pressure monitoring per protocol


-Aspirin, Brilinta, lisinopril, metoprolol, Lipitor


-Echocardiogram pending read


-Education on lifestyle modification including weight loss, tobacco abuse 

cessation, dietary modification





Respiratory: Nicotine abuse


-Patient admits smoking to 3 packs/day


-Supplemental oxygen as needed


-Currently on room air


-SPO2 monitor per protocol


-Pulmonary hygiene


-Continue cessation strongly encouraged





GI: Morbid obesity


-Encourage lifestyle modifications outpatient


-Cardiac diet





: NAD


-Monitor intake and output


-Renally dose medications


-Avoid nephrotoxic medications


-Trend BMP





ID: NAD


-Monitor WBC and temperature curve





Endo: Hyperglycemia


-Hemoglobin A1c


-Avoid hypoglycemia


-SSI


-Accu-Cheks q. ACHS





Heme: Leukocytosis


-Trend CBC


-Transfuse hemoglobin less than 7


-SCDs to BLE while in bed














The high probability of a clinically significant, sudden or life threatening 

deterioration of the [] system(s) required my full and direct attention, 

intervention and personal management. The aggregate critical care time was [] 

minutes. This time is in addition to time spent performing reported procedures 

but includes the following: 





[x] Data Review and interpretation 





[x] Patient assessment and monitoring of vital signs 





[x] Documentation 





[x] Medication orders and management


Disposition Plan: transfer to floor


Total Time Spent with Patient (Minutes): 60





History


Interval history: 


This is 57 year old male male with current nicotine abuse, etoh abuse who 

presented to the emergency department on 9/22 with severe retrosternal chest 

pain with radiation to the left arm associated with nausea and diaphoresis. In 

the emergency department patient lost consciousness and was taken to ED Main 

where an ECG was ran and showed STEMI.  On-call cardiologist was consulted and 

patient was taken to Cath Lab where he was found to have a complete occlusion of

 the right coronary artery in its proximal segment and he received primary 

angioplasty with stent placement.  Patient was admitted to the hospital service 

with consults to CCM to the ICU post PCI.





Hospital course to date:





9/23: Patient completed Aggrastat drip, no chest pain reported.  Patient walked 

around the unit with RN.  GDMT decreased dosage due to to hypotension.  Patient 

continues on aspirin and Brilinta.  Echocardiogram pending.  Patient will be 

transferred to telemetry with remote telemetry.








Hospitalist Physical





- Constitutional


Vitals: 


                                        











Temp Pulse Resp BP Pulse Ox


 


 98.8 F   67   21   111/70   92 


 


 09/23/22 12:00  09/23/22 15:10  09/23/22 15:10  09/23/22 14:50  09/23/22 15:10











General appearance: Present: no acute distress, well-nourished, obese





- EENT


Eyes: Present: PERRL, EOM intact


ENT: hearing intact, clear oral mucosa, dentition normal





- Neck


Neck: Present: normal ROM





- Respiratory


Respiratory effort: normal


Respiratory: bilateral: CTA, diminished





- Cardiovascular


Rhythm: regular


Heart Sounds: Present: S1 & S2.  Absent: systolic murmur, diastolic murmur





- Extremities


Extremities: no ischemia, pulses intact, pulses symmetrical, No edema, normal 

temperature, normal color, Full ROM


Peripheral Pulses: within normal limits





- Abdominal


General gastrointestinal: soft, non-tender, non-distended, normal bowel sounds





- Integumentary


Integumentary: Present: warm, dry





- Psychiatric


Psychiatric: cooperative





- Neurologic


Neurologic: CNII-XII intact, no focal deficits, moves all extremities





- Allied Health


Allied health notes reviewed: nursing, RT, social work





HEART Score





- HEART Score


EKG: Significant ST-depression


Age: 45-65


Risk factors: 1-2 risk factors


Troponin: 


                                        











Troponin T  5.980 ng/mL (0.00-0.029)  H* D 09/23/22  04:15    











Troponin: < normal limit





- Critical Actions


Critical Actions: 4-6 pts:12-16.6% risk of adverse cardiac event. Should be 

admitted





Results





- Labs


CBC & Chem 7: 


                                 09/23/22 04:15





                                 09/23/22 04:15


Labs: 


                             Laboratory Last Values











WBC  11.8 K/mm3 (4.5-11.0)  H  09/23/22  04:15    


 


RBC  4.53 M/mm3 (3.65-5.03)   09/23/22  04:15    


 


Hgb  14.8 gm/dl (11.8-15.2)   09/23/22  04:15    


 


Hct  42.3 % (35.5-45.6)   09/23/22  04:15    


 


MCV  93 fl (84-94)   09/23/22  04:15    


 


MCH  33 pg (28-32)  H  09/23/22  04:15    


 


MCHC  35 % (32-34)  H  09/23/22  04:15    


 


RDW  12.8 % (13.2-15.2)  L  09/23/22  04:15    


 


Plt Count  166 K/mm3 (140-440)   09/23/22  04:15    


 


Lymph % (Auto)  19.7 % (13.4-35.0)   09/23/22  04:15    


 


Mono % (Auto)  10.8 % (0.0-7.3)  H  09/23/22  04:15    


 


Eos % (Auto)  0.6 % (0.0-4.3)   09/23/22  04:15    


 


Baso % (Auto)  0.5 % (0.0-1.8)   09/23/22  04:15    


 


Lymph # (Auto)  2.3 K/mm3 (1.2-5.4)   09/23/22  04:15    


 


Mono # (Auto)  1.3 K/mm3 (0.0-0.8)  H  09/23/22  04:15    


 


Eos # (Auto)  0.1 K/mm3 (0.0-0.4)   09/23/22  04:15    


 


Baso # (Auto)  0.1 K/mm3 (0.0-0.1)   09/23/22  04:15    


 


Seg Neutrophils %  68.4 % (40.0-70.0)   09/23/22  04:15    


 


Seg Neutrophils #  8.1 K/mm3 (1.8-7.7)  H  09/23/22  04:15    


 


PT  15.6 Sec. (12.2-14.9)  H  09/22/22  16:59    


 


INR  1.11  (0.87-1.13)   09/22/22  16:59    


 


Sodium  138 mmol/L (137-145)   09/23/22  04:15    


 


Potassium  4.7 mmol/L (3.6-5.0)   09/23/22  04:15    


 


Chloride  104.8 mmol/L ()   09/23/22  04:15    


 


Carbon Dioxide  23 mmol/L (22-30)   09/23/22  04:15    


 


Anion Gap  15 mmol/L  09/23/22  04:15    


 


BUN  21 mg/dL (9-20)  H  09/23/22  04:15    


 


Creatinine  0.8 mg/dL (0.8-1.3)   09/23/22  04:15    


 


Estimated GFR  > 60 ml/min  09/23/22  04:15    


 


BUN/Creatinine Ratio  26 %  09/23/22  04:15    


 


Glucose  214 mg/dL ()  H  09/23/22  04:15    


 


POC Glucose  213 mg/dL ()  H  09/23/22  16:25    


 


Calcium  9.0 mg/dL (8.4-10.2)   09/23/22  04:15    


 


Magnesium  1.80 mg/dL (1.7-2.3)   09/23/22  04:00    


 


Total Bilirubin  0.90 mg/dL (0.1-1.2)   09/23/22  04:15    


 


AST  209 units/L (5-40)  H  09/23/22  04:15    


 


ALT  55 units/L (7-56)   09/23/22  04:15    


 


Alkaline Phosphatase  69 units/L ()   09/23/22  04:15    


 


Total Creatine Kinase  214 units/L ()  H  09/22/22  16:59    


 


Troponin T  5.980 ng/mL (0.00-0.029)  H* D 09/23/22  04:15    


 


C-Reactive Protein  0.40 mg/dL (0.00-1.30)   09/22/22  16:59    


 


NT-Pro-B Natriuret Pep  57.84 pg/mL (0-900)   09/22/22  16:59    


 


Total Protein  5.8 g/dL (6.3-8.2)  L  09/23/22  04:15    


 


Albumin  4.0 g/dL (3.9-5)   09/23/22  04:15    


 


Albumin/Globulin Ratio  2.2 %  09/23/22  04:15    


 


Triglycerides  126 mg/dL (2-149)   09/23/22  04:15    


 


Cholesterol  166 mg/dL ()   09/23/22  04:15    


 


LDL Cholesterol Direct  92 mg/dL ()   09/23/22  04:15    


 


HDL Cholesterol  56 mg/dL (40-59)   09/23/22  04:15    


 


Cholesterol/HDL Ratio  2.96 %  09/23/22  04:15    


 


Lipase  53 units/L (13-60)   09/22/22  16:59    


 


Urine Color  Yellow  (Yellow)   09/23/22  08:20    


 


Urine Turbidity  Clear  (Clear)   09/23/22  08:20    


 


Specific Gravity (Man)  1.031  (1.003-1.030)  H  09/23/22  08:20    


 


Ur Protein (Man)  Negative mg/dL (Negative)   09/23/22  08:20    


 


Ur Ketones (Man)  1+  (Negative)   09/23/22  08:20    


 


Ur Nitrite (Man)  Negative  (Negative)   09/23/22  08:20    


 


Urine Bilirubin (Man)  Negative  (Negative)   09/23/22  08:20    


 


Leukocyte Esterase (Man)  Negative  (Negative)   09/23/22  08:20    


 


Urine WBC (Auto)  < 1.0 /HPF (0.0-6.0)   09/23/22  08:20    


 


Urine RBC (Auto)  < 1.0 /HPF (0.0-6.0)   09/23/22  08:20    


 


Urine RBC (Manual)  1+  (Negative)   09/23/22  08:20    


 


Urine Mucus  Few /HPF  09/23/22  08:20    


 


Urine Opiates Screen  Negative   09/23/22  08:20    


 


Urine Methadone Screen  Negative   09/23/22  08:20    


 


Ur Barbiturates Screen  Negative   09/23/22  08:20    


 


Ur Phencyclidine Scrn  Negative   09/23/22  08:20    


 


Ur Amphetamines Screen  Negative   09/23/22  08:20    


 


U Benzodiazepines Scrn  Negative   09/23/22  08:20    


 


Urine Cocaine Screen  Negative   09/23/22  08:20    


 


U Marijuana (THC) Screen  Negative   09/23/22  08:20    


 


Drugs of Abuse Note  Disclamer   09/23/22  08:20    











Hua/IV: 


                                        





Voiding Method                   Urinal











Active Medications





- Current Medications


Current Medications: 














Generic Name Dose Route Start Last Admin





  Trade Name Freq  PRN Reason Stop Dose Admin


 


Acetaminophen  650 mg  09/22/22 18:08 





  Acetaminophen 325 Mg Tab  PO  





  Q4H PRN  





  Pain MILD(1-3)/Fever >100.5/HA  


 


Aspirin  81 mg  09/23/22 10:00  09/23/22 09:26





  Aspirin Ec 81 Mg Tab  PO   81 mg





  QDAY MAURICE   Administration


 


Atorvastatin Calcium  80 mg  09/22/22 22:00  09/22/22 21:38





  Atorvastatin 40 Mg Tab  PO   80 mg





  QHS MAURICE   Administration


 


Chlordiazepoxide HCl  50 mg  09/23/22 17:05 





  Chlordiazepoxide 25 Mg Cap  PO  





  Q1HR PRN  





  CIWA-Ar 8-15  


 


Chlordiazepoxide HCl  100 mg  09/23/22 17:05 





  Chlordiazepoxide 25 Mg Cap  PO  





  Q1HR PRN  





  CIWA-Ar 16-25  


 


Famotidine  20 mg  09/22/22 22:00  09/23/22 09:26





  Famotidine 20 Mg Tab  PO   20 mg





  BID MAURICE   Administration


 


Insulin Human Lispro  0 unit  09/23/22 07:30  09/23/22 12:26





  Insulin Lispro 100 Unit/Ml  SUB-Q   4 unit





  ACHS MAURICE   Administration





  Protocol  


 


Lisinopril  5 mg  09/23/22 10:00  09/23/22 09:26





  Lisinopril 5 Mg Tab  PO   5 mg





  QDAY MAURICE   Administration


 


Metoprolol Tartrate  25 mg  09/23/22 22:00 





  Metoprolol Tartrate 25 Mg Tab  PO  





  BID MAURICE  


 


Morphine Sulfate  2 mg  09/22/22 18:13 





  Morphine 2 Mg/1 Ml Inj  IV  





  Q4H PRN  





  Pain, Moderate (4-6)  


 


Ondansetron HCl  4 mg  09/22/22 18:08 





  Ondansetron 4 Mg/2 Ml Inj  IV  





  Q8H PRN  





  Nausea And Vomiting  


 


Oxycodone/Acetaminophen  1 tab  09/22/22 18:13 





  Oxycodone /Acetaminophen 5-325mg Tab  PO  





  Q6H PRN  





  Pain, Moderate (4-6)  


 


Sodium Chloride  10 ml  09/22/22 22:00  09/23/22 09:27





  Sodium Chloride 0.9% 10 Ml Flush Syringe  IV   10 ml





  BID MAURICE   Administration


 


Sodium Chloride  10 ml  09/22/22 18:08 





  Sodium Chloride 0.9% 10 Ml Flush Syringe  IV  





  PRN PRN  





  LINE FLUSH  


 


Ticagrelor  90 mg  09/22/22 22:00  09/23/22 09:26





  Ticagrelor 90 Mg Tab  PO   90 mg





  BID MAURICE   Administration

## 2022-09-23 NOTE — XRAY REPORT
CHEST 1 VIEW 



INDICATION / CLINICAL INFORMATION:

post pci.



COMPARISON: 

None available.



FINDINGS:



SUPPORT DEVICES: None.



HEART / MEDIASTINUM: Mild cardiac enlargement. 



LUNGS / PLEURA: Mild bilateral interstitial prominence most consistent with mild interstitial pulmona
ry edema. The lungs are otherwise grossly clear. No pleural effusion. No pneumothorax. 



ADDITIONAL FINDINGS: No significant additional findings.



IMPRESSION:

1. Mild interstitial pulmonary edema.



Signer Name: Leny Watson MD 

Signed: 9/23/2022 4:45 AM

Workstation Name: [id number]

## 2022-09-24 LAB
BILIRUB UR QL STRIP: (no result)
BLOOD UR QL VISUAL: (no result)
BUN SERPL-MCNC: 15 MG/DL (ref 9–20)
BUN/CREAT SERPL: 21 %
CALCIUM SERPL-MCNC: 8.4 MG/DL (ref 8.4–10.2)
HCT VFR BLD CALC: 40 % (ref 35.5–45.6)
HEMOLYSIS INDEX: 24
HGB BLD-MCNC: 13.8 GM/DL (ref 11.8–15.2)
MCHC RBC AUTO-ENTMCNC: 35 % (ref 32–34)
MCV RBC AUTO: 94 FL (ref 84–94)
MUCOUS THREADS #/AREA URNS HPF: (no result) /HPF
PH UR STRIP: 6 [PH] (ref 5–7)
PLATELET # BLD: 121 K/MM3 (ref 140–440)
RBC # BLD AUTO: 4.25 M/MM3 (ref 3.65–5.03)
RBC #/AREA URNS HPF: 16 /HPF (ref 0–6)
UROBILINOGEN UR-MCNC: < 2 MG/DL (ref ?–2)
WBC #/AREA URNS HPF: 1 /HPF (ref 0–6)

## 2022-09-24 RX ADMIN — ASPIRIN SCH MG: 81 TABLET, COATED ORAL at 09:58

## 2022-09-24 RX ADMIN — Medication SCH ML: at 10:00

## 2022-09-24 RX ADMIN — METOPROLOL TARTRATE SCH MG: 25 TABLET, FILM COATED ORAL at 09:58

## 2022-09-24 RX ADMIN — SODIUM CHLORIDE FOR IRRIGATION SCH ML: 0.9 SOLUTION IRRIGATION at 18:39

## 2022-09-24 RX ADMIN — SODIUM CHLORIDE FOR IRRIGATION SCH ML: 0.9 SOLUTION IRRIGATION at 13:00

## 2022-09-24 RX ADMIN — INSULIN LISPRO SCH: 100 INJECTION, SOLUTION INTRAVENOUS; SUBCUTANEOUS at 17:21

## 2022-09-24 RX ADMIN — PHENAZOPYRIDINE SCH MG: 100 TABLET ORAL at 22:04

## 2022-09-24 RX ADMIN — LISINOPRIL SCH MG: 5 TABLET ORAL at 09:58

## 2022-09-24 RX ADMIN — INSULIN LISPRO SCH UNIT: 100 INJECTION, SOLUTION INTRAVENOUS; SUBCUTANEOUS at 09:58

## 2022-09-24 RX ADMIN — INSULIN LISPRO SCH UNIT: 100 INJECTION, SOLUTION INTRAVENOUS; SUBCUTANEOUS at 13:32

## 2022-09-24 RX ADMIN — INSULIN LISPRO SCH UNIT: 100 INJECTION, SOLUTION INTRAVENOUS; SUBCUTANEOUS at 22:00

## 2022-09-24 RX ADMIN — PHENAZOPYRIDINE SCH MG: 100 TABLET ORAL at 09:57

## 2022-09-24 RX ADMIN — FAMOTIDINE SCH MG: 20 TABLET ORAL at 22:01

## 2022-09-24 RX ADMIN — PHENAZOPYRIDINE SCH MG: 100 TABLET ORAL at 13:32

## 2022-09-24 RX ADMIN — FAMOTIDINE SCH MG: 20 TABLET ORAL at 09:58

## 2022-09-24 RX ADMIN — Medication SCH ML: at 20:00

## 2022-09-24 RX ADMIN — METOPROLOL TARTRATE SCH MG: 25 TABLET, FILM COATED ORAL at 22:01

## 2022-09-24 RX ADMIN — SODIUM CHLORIDE FOR IRRIGATION SCH ML: 0.9 SOLUTION IRRIGATION at 22:00

## 2022-09-24 NOTE — PROGRESS NOTE
Assessment and Plan


Assessment and plan: 


This is 57 year old male male with current nicotine abuse, etoh abuse who 

presented to the emergency department on 9/22 with severe retrosternal chest 

pain with radiation to the left arm associated with nausea and diaphoresis. In 

the emergency department patient lost consciousness and was taken to ED Main 

where an ECG was ran and showed STEMI.  On-call cardiologist was consulted and 

patient was taken to Cath Lab where he was found to have a complete occlusion of

the right coronary artery in its proximal segment and he received primary 

angioplasty with stent placement.  Patient was admitted to the hospital service 

with consults to CCM to the ICU post PCI.





HE WAS admitted with STEMI s/p PCI and stent placement to RCA.  


Hospital course to date:





9/23: Patient completed Aggrastat drip, no chest pain reported.  Patient walked 

around the unit with RN.  GDMT decreased dosage due to to hypotension.  Patient 

continues on aspirin and Brilinta.  Echocardiogram pending.  Patient will be 

transferred to telemetry with remote telemetry.





9/24: Patient seen and examined this morning urinal seen large amount of red 

urine noted.  Discussed with cardiology will switch from Brilinta to Plavix.  

Aggrastat has also been stopped yesterday.  Anticipate that this will improve.  

Due to the noted or reported clots we will obtain a CT abdomen and pelvis for 

evaluation.  We will also consult urology for evaluation.  Plan discussed with 

the patient he verbalized understanding








Neuro: EtOH abuse (2 beers per day)


-[facility] protocol


-Reorientation as needed


-Maintain sleep-wake cycle


-As needed analgesia





Cardiac: STEMI s/p PCI with stent placement to RCA


-Cardiology consulted, appreciate recommendations


-S/p cardiac cath which revealed 100% occlusion of proximal RCA


-S/p stent placement to RCA


-Blood pressure monitoring per protocol


-Aspirin, Brilinta (discontinued and switched to Plavix), lisinopril, 

metoprolol, Lipitor


-Echocardiogram pending read


-Education on lifestyle modification including weight loss, tobacco abuse 

cessation, dietary modification





Respiratory: Nicotine abuse


-Patient admits smoking to 3 packs/day


-Supplemental oxygen as needed


-Currently on room air


-SPO2 monitor per protocol


-Pulmonary hygiene


-Continue cessation strongly encouraged





GI: Morbid obesity


-Encourage lifestyle modifications outpatient


-Cardiac diet





: Hematuria


-Monitor intake and output


-Renally dose medications


-Avoid nephrotoxic medications


-Trend BMP


-We will obtain CT abdomen pelvis without contrast





ID: [id number]


-Monitor WBC and temperature curve





Endo: Hyperglycemia/morbidly obese


-Hemoglobin A1c


-Avoid hypoglycemia


-SSI


-Accu-Cheks q. ACHS


-50 minutes counseling provided to the patient on the importance of weight loss 

he verbalized understanding


Heme: Leukocytosis


-Trend CBC


-Transfuse hemoglobin less than 7


-SCDs to BLE while in bed











History


Interval history: 


Patient seen and examined this morning reports hematuria with associated blood 

clots and difficulty with urination.








Hospitalist Physical





- Physical exam


Narrative exam: 


VITAL SIGNS:  Reviewed.    


GENERAL:  The patient appears normally developed, morbidly obese vital signs as 

documented.


HEAD:  No signs of head trauma.


EYES:  Pupils are equal.  Extraocular motions intact.  


EARS:  Hearing grossly intact.


MOUTH:  Oropharynx is normal. 


NECK:  No adenopathy, no JVD.  


CHEST:  Chest with clear breath sounds bilaterally.  No wheezes, rales, or 

rhonchi.  


CARDIAC:  Regular rate and rhythm.  S1 and S2, without murmurs, gallops, or 

rubs.


VASCULAR:  No Edema.  Peripheral pulses normal and equal in all extremities.


ABDOMEN:  Soft, non tender and non distended.  No   rebound or guarding, and no 

masses palpated.   Bowel Sounds normal.


MUSCULOSKELETAL:  Good range of motion of all major joints. Extremities without 

clubbing, cyanosis or edema.  


NEUROLOGIC EXAM:  Alert and oriented x 3   No focal sensory or strength 

deficits.   Speech normal.  Follows commands.


PSYCHIATRIC:  Mood normal.


SKIN:  detail exam as documented in skin assessment








- Constitutional


Vitals: 


                                        











Temp Pulse Resp BP Pulse Ox


 


 97.7 F   76   18   118/82   98 


 


 09/24/22 07:33  09/24/22 07:33  09/24/22 07:33  09/24/22 07:33  09/24/22 08:05











General appearance: Present: no acute distress, well-nourished, obese





HEART Score





- HEART Score


EKG: Significant ST-depression


Age: 45-65


Risk factors: 1-2 risk factors


Troponin: 


                                        











Troponin T  3.750 ng/mL (0.00-0.029)  H* D 09/23/22  20:07    











Troponin: < normal limit





- Critical Actions


Critical Actions: 4-6 pts:12-16.6% risk of adverse cardiac event. Should be 

admitted





Results





- Labs


CBC & Chem 7: 


                                 09/24/22 04:30





                                 09/24/22 04:00


Labs: 


                             Laboratory Last Values











WBC  8.7 K/mm3 (4.5-11.0)   09/24/22  04:30    


 


RBC  4.25 M/mm3 (3.65-5.03)   09/24/22  04:30    


 


Hgb  13.8 gm/dl (11.8-15.2)   09/24/22  04:30    


 


Hct  40.0 % (35.5-45.6)   09/24/22  04:30    


 


MCV  94 fl (84-94)   09/24/22  04:30    


 


MCH  33 pg (28-32)  H  09/24/22  04:30    


 


MCHC  35 % (32-34)  H  09/24/22  04:30    


 


RDW  13.0 % (13.2-15.2)  L  09/24/22  04:30    


 


Plt Count  121 K/mm3 (140-440)  L  09/24/22  04:30    


 


Lymph % (Auto)  19.7 % (13.4-35.0)   09/23/22  04:15    


 


Mono % (Auto)  10.8 % (0.0-7.3)  H  09/23/22  04:15    


 


Eos % (Auto)  0.6 % (0.0-4.3)   09/23/22  04:15    


 


Baso % (Auto)  0.5 % (0.0-1.8)   09/23/22  04:15    


 


Lymph # (Auto)  2.3 K/mm3 (1.2-5.4)   09/23/22  04:15    


 


Mono # (Auto)  1.3 K/mm3 (0.0-0.8)  H  09/23/22  04:15    


 


Eos # (Auto)  0.1 K/mm3 (0.0-0.4)   09/23/22  04:15    


 


Baso # (Auto)  0.1 K/mm3 (0.0-0.1)   09/23/22  04:15    


 


Seg Neutrophils %  68.4 % (40.0-70.0)   09/23/22  04:15    


 


Seg Neutrophils #  8.1 K/mm3 (1.8-7.7)  H  09/23/22  04:15    


 


PT  15.6 Sec. (12.2-14.9)  H  09/22/22  16:59    


 


INR  1.11  (0.87-1.13)   09/22/22  16:59    


 


Sodium  138 mmol/L (137-145)   09/24/22  04:00    


 


Potassium  4.6 mmol/L (3.6-5.0)   09/24/22  04:00    


 


Chloride  103.3 mmol/L ()   09/24/22  04:00    


 


Carbon Dioxide  23 mmol/L (22-30)   09/24/22  04:00    


 


Anion Gap  16 mmol/L  09/24/22  04:00    


 


BUN  15 mg/dL (9-20)   09/24/22  04:00    


 


Creatinine  0.7 mg/dL (0.8-1.3)  L  09/24/22  04:00    


 


Estimated GFR  > 60 ml/min  09/24/22  04:00    


 


BUN/Creatinine Ratio  21 %  09/24/22  04:00    


 


Glucose  193 mg/dL ()  H  09/24/22  04:00    


 


POC Glucose  208 mg/dL ()  H  09/23/22  21:07    


 


Hemoglobin A1c  8.7 % (4-6)  H  09/23/22  20:07    


 


Calcium  8.4 mg/dL (8.4-10.2)   09/24/22  04:00    


 


Phosphorus  2.80 mg/dL (2.5-4.5)   09/23/22  20:07    


 


Magnesium  1.70 mg/dL (1.7-2.3)   09/23/22  20:07    


 


Total Bilirubin  0.90 mg/dL (0.1-1.2)   09/23/22  04:15    


 


AST  209 units/L (5-40)  H  09/23/22  04:15    


 


ALT  55 units/L (7-56)   09/23/22  04:15    


 


Alkaline Phosphatase  69 units/L ()   09/23/22  04:15    


 


Total Creatine Kinase  214 units/L ()  H  09/22/22  16:59    


 


Troponin T  3.750 ng/mL (0.00-0.029)  H* D 09/23/22  20:07    


 


C-Reactive Protein  0.40 mg/dL (0.00-1.30)   09/22/22  16:59    


 


NT-Pro-B Natriuret Pep  57.84 pg/mL (0-900)   09/22/22  16:59    


 


Total Protein  5.8 g/dL (6.3-8.2)  L  09/23/22  04:15    


 


Albumin  4.0 g/dL (3.9-5)   09/23/22  04:15    


 


Albumin/Globulin Ratio  2.2 %  09/23/22  04:15    


 


Triglycerides  126 mg/dL (2-149)   09/23/22  04:15    


 


Cholesterol  166 mg/dL ()   09/23/22  04:15    


 


LDL Cholesterol Direct  92 mg/dL ()   09/23/22  04:15    


 


HDL Cholesterol  56 mg/dL (40-59)   09/23/22  04:15    


 


Cholesterol/HDL Ratio  2.96 %  09/23/22  04:15    


 


Lipase  53 units/L (13-60)   09/22/22  16:59    


 


Urine Color  Yellow  (Yellow)   09/23/22  08:20    


 


Urine Turbidity  Clear  (Clear)   09/23/22  08:20    


 


Specific Gravity (Man)  1.031  (1.003-1.030)  H  09/23/22  08:20    


 


Ur Protein (Man)  Negative mg/dL (Negative)   09/23/22  08:20    


 


Ur Ketones (Man)  1+  (Negative)   09/23/22  08:20    


 


Ur Nitrite (Man)  Negative  (Negative)   09/23/22  08:20    


 


Urine Bilirubin (Man)  Negative  (Negative)   09/23/22  08:20    


 


Leukocyte Esterase (Man)  Negative  (Negative)   09/23/22  08:20    


 


Urine WBC (Auto)  < 1.0 /HPF (0.0-6.0)   09/23/22  08:20    


 


Urine RBC (Auto)  < 1.0 /HPF (0.0-6.0)   09/23/22  08:20    


 


Urine RBC (Manual)  1+  (Negative)   09/23/22  08:20    


 


Urine Mucus  Few /HPF  09/23/22  08:20    


 


Urine Opiates Screen  Negative   09/23/22  08:20    


 


Urine Methadone Screen  Negative   09/23/22  08:20    


 


Ur Barbiturates Screen  Negative   09/23/22  08:20    


 


Ur Phencyclidine Scrn  Negative   09/23/22  08:20    


 


Ur Amphetamines Screen  Negative   09/23/22  08:20    


 


U Benzodiazepines Scrn  Negative   09/23/22  08:20    


 


Urine Cocaine Screen  Negative   09/23/22  08:20    


 


U Marijuana (THC) Screen  Negative   09/23/22  08:20    


 


Drugs of Abuse Note  Disclamer   09/23/22  08:20    











Hua/IV: 


                                        





Voiding Method                   Urinal











Active Medications





- Current Medications


Current Medications: 














Generic Name Dose Route Start Last Admin





  Trade Name Freq  PRN Reason Stop Dose Admin


 


Acetaminophen  650 mg  09/22/22 18:08 





  Acetaminophen 325 Mg Tab  PO  





  Q4H PRN  





  Pain MILD(1-3)/Fever >100.5/HA  


 


Aspirin  81 mg  09/23/22 10:00  09/23/22 09:26





  Aspirin Ec 81 Mg Tab  PO   81 mg





  QDAY MAURICE   Administration


 


Atorvastatin Calcium  80 mg  09/22/22 22:00  09/23/22 22:02





  Atorvastatin 40 Mg Tab  PO   80 mg





  QHS MAURICE   Administration


 


Chlordiazepoxide HCl  50 mg  09/23/22 18:00 





  Chlordiazepoxide 25 Mg Cap  PO  





  Q1HR PRN  





  CIWA-Ar 8-15  


 


Chlordiazepoxide HCl  100 mg  09/23/22 18:00 





  Chlordiazepoxide 25 Mg Cap  PO  





  Q1HR PRN  





  CIWA-Ar 16-25  


 


Clopidogrel Bisulfate  75 mg  09/25/22 10:00 





  Clopidogrel 75 Mg Tab  PO  





  QDAY MAURICE  


 


Famotidine  20 mg  09/22/22 22:00  09/23/22 22:03





  Famotidine 20 Mg Tab  PO   20 mg





  BID MAURICE   Administration


 


Insulin Human Lispro  0 unit  09/23/22 07:30  09/23/22 22:03





  Insulin Lispro 100 Unit/Ml  SUB-Q   4 unit





  ACHS MAURICE   Administration





  Protocol  


 


Lisinopril  5 mg  09/23/22 10:00  09/23/22 09:26





  Lisinopril 5 Mg Tab  PO   5 mg





  QDAY MAURICE   Administration


 


Metoprolol Tartrate  25 mg  09/23/22 22:00  09/23/22 22:03





  Metoprolol Tartrate 25 Mg Tab  PO   25 mg





  BID MAURICE   Administration


 


Morphine Sulfate  2 mg  09/22/22 18:13 





  Morphine 2 Mg/1 Ml Inj  IV  





  Q4H PRN  





  Pain, Moderate (4-6)  


 


Ondansetron HCl  4 mg  09/22/22 18:08 





  Ondansetron 4 Mg/2 Ml Inj  IV  





  Q8H PRN  





  Nausea And Vomiting  


 


Oxycodone/Acetaminophen  1 tab  09/22/22 18:13 





  Oxycodone /Acetaminophen 5-325mg Tab  PO  





  Q6H PRN  





  Pain, Moderate (4-6)  


 


Phenazopyridine HCl  100 mg  09/24/22 10:00 





  Phenazopyridine 100 Mg Tab  PO  





  Q8HR MAURICE  


 


Sodium Chloride  10 ml  09/22/22 22:00  09/23/22 22:03





  Sodium Chloride 0.9% 10 Ml Flush Syringe  IV   10 ml





  BID MAURICE   Administration


 


Sodium Chloride  10 ml  09/22/22 18:08 





  Sodium Chloride 0.9% 10 Ml Flush Syringe  IV  





  PRN PRN  





  LINE FLUSH

## 2022-09-24 NOTE — CONSULTATION
History of Present Illness





- Reason for Consult


Consult date: 09/24/22





- History of Present Illness








NEW TO OUR SERVICE





This is 57 year old male male with current nicotine abuse, etoh abuse who 

presented to the emergency department on 9/22 with severe retrosternal chest 

pain with radiation to the left arm associated with nausea and diaphoresis. In 

the emergency department patient lost consciousness and was taken to ED Main 

where an ECG was ran and showed STEMI.  On-call cardiologist (Dr. Douglas) was 

consulted and patient was taken to Cath Lab where he was found to have a 

complete occlusion of the right coronary artery in its proximal segment and he 

received primary angioplasty with stent placement.  Patient was admitted to the 

hospital service with consults to CCM to the ICU post PCI.





HE WAS admitted with STEMI s/p PCI and stent placement to RCA.  


Hospital course to date:





9/23: Patient completed Aggrastat drip, no chest pain reported.  Patient walked 

around the unit with RN.  GDMT decreased dosage due to to hypotension.  Patient 

continues on aspirin and Brilinta.  Echocardiogram pending.  Patient will be 

transferred to telemetry with remote telemetry.





9/24: Patient seen and examined this morning urinal seen large amount of red 

urine noted. Switched from Brilinta to Plavix.  Aggrastat has also been stopped 

yesterday.  Anticipate that this will improve.  CT abdomen and pelvis -- no 

acute findings





abd soft





22F [address] [facility]---irrigated a few small clots





a/p





gross hematuria


CBI today


possible home with [facility] in am





Past History


Past Medical History: No medical history


Past Surgical History: No surgical history


Social history: lives with family, smoking, full code, other (Alcohol socially.)


Family history: hypertension





Medications and Allergies


                                    Allergies











Allergy/AdvReac Type Severity Reaction Status Date / Time


 


No Known Allergies Allergy   Verified 09/22/22 17:45











                                Home Medications











 Medication  Instructions  Recorded  Confirmed  Last Taken  Type


 


No Known Home Medications [No  09/22/22 09/22/22 Unknown History





Reported Home Medications]     











Active Meds: 


Active Medications





Acetaminophen (Acetaminophen 325 Mg Tab)  650 mg PO Q4H PRN


   PRN Reason: Pain MILD(1-3)/Fever >100.5/HA


Aspirin (Aspirin Ec 81 Mg Tab)  81 mg PO QDAY [facility]


   Last Admin: 09/24/22 09:58 Dose:  81 mg


   


Atorvastatin Calcium (Atorvastatin 40 Mg Tab)  80 mg PO QHS [facility]


   Last Admin: 09/23/22 22:02 Dose:  80 mg


   


Chlordiazepoxide HCl (Chlordiazepoxide 25 Mg Cap)  50 mg PO Q1HR PRN


   PRN Reason: CIWA-Ar 8-15


Chlordiazepoxide HCl (Chlordiazepoxide 25 Mg Cap)  100 mg PO Q1HR PRN


   PRN Reason: CIWA-Ar 16-25


Clopidogrel Bisulfate (Clopidogrel 75 Mg Tab)  75 mg PO QDAY [facility]


Famotidine (Famotidine 20 Mg Tab)  20 mg PO BID [facility]


   Last Admin: 09/24/22 09:58 Dose:  20 mg


   


Insulin Human Lispro (Insulin Lispro 100 Unit/Ml)  0 unit SUB-Q ACHS MAURICE; P

rotocol


   Last Admin: 09/24/22 09:58 Dose:  3 unit


   


Lisinopril (Lisinopril 5 Mg Tab)  5 mg PO QDAY [facility]


   Last Admin: 09/24/22 09:58 Dose:  5 mg


   


Metoprolol Tartrate (Metoprolol Tartrate 25 Mg Tab)  25 mg PO BID [facility]


   Last Admin: 09/24/22 09:58 Dose:  25 mg


   


Morphine Sulfate (Morphine 2 Mg/1 Ml Inj)  2 mg IV Q4H PRN


   PRN Reason: Pain, Moderate (4-6)


Ondansetron HCl (Ondansetron 4 Mg/2 Ml Inj)  4 mg IV Q8H PRN


   PRN Reason: Nausea And Vomiting


Oxycodone/Acetaminophen (Oxycodone /Acetaminophen 5-325mg Tab)  1 tab PO Q6H PRN


   PRN Reason: Pain, Moderate (4-6)


Phenazopyridine HCl (Phenazopyridine 100 Mg Tab)  100 mg PO Q8HR [facility]


   Last Admin: 09/24/22 09:57 Dose:  100 mg


   


Sodium Chloride (Sodium Chloride 0.9% 10 Ml Flush Syringe)  10 ml IV BID [facility]


   Last Admin: 09/24/22 10:00 Dose:  10 ml


   


Sodium Chloride (Sodium Chloride 0.9% 10 Ml Flush Syringe)  10 ml IV PRN PRN


   PRN Reason: LINE FLUSH


Sodium Chloride (Sodium Chloride 0.9% Irrig Soln 2000 Ml)  2,000 ml IR AS DIRECT

[facility]











Exam





- Constitutional


Vitals: 


                                        











Temp Pulse Resp BP Pulse Ox


 


 97.7 F   76   18   118/82   98 


 


 09/24/22 07:33  09/24/22 07:33  09/24/22 07:33  09/24/22 07:33  09/24/22 08:05














Results





- Labs


CBC & Chem 7: 


                                 09/24/22 04:30





                                 09/24/22 04:00


Labs: 


                              Abnormal lab results











  09/23/22 09/23/22 09/23/22 Range/Units





  11:39 16:25 20:07 


 


MCH     (28-32)  pg


 


MCHC     (32-34)  %


 


RDW     (13.2-15.2)  %


 


Plt Count     (140-440)  K/mm3


 


Creatinine     (0.8-1.3)  mg/dL


 


Glucose     ()  mg/dL


 


POC Glucose  205 H  213 H   ()  mg/dL


 


Hemoglobin A1c     (4-6)  %


 


Troponin T    3.750 H* D  (0.00-0.029)  ng/mL














  09/23/22 09/23/22 09/24/22 Range/Units





  20:07 21:07 04:00 


 


MCH     (28-32)  pg


 


MCHC     (32-34)  %


 


RDW     (13.2-15.2)  %


 


Plt Count     (140-440)  K/mm3


 


Creatinine    0.7 L  (0.8-1.3)  mg/dL


 


Glucose    193 H  ()  mg/dL


 


POC Glucose   208 H   ()  mg/dL


 


Hemoglobin A1c  8.7 H    (4-6)  %


 


Troponin T     (0.00-0.029)  ng/mL














  09/24/22 09/24/22 09/24/22 Range/Units





  04:30 07:35 11:19 


 


MCH  33 H    (28-32)  pg


 


MCHC  35 H    (32-34)  %


 


RDW  13.0 L    (13.2-15.2)  %


 


Plt Count  121 L    (140-440)  K/mm3


 


Creatinine     (0.8-1.3)  mg/dL


 


Glucose     ()  mg/dL


 


POC Glucose   187 H  194 H  ()  mg/dL


 


Hemoglobin A1c     (4-6)  %


 


Troponin T     (0.00-0.029)  ng/mL

## 2022-09-24 NOTE — PROGRESS NOTE
Assessment and Plan





- Patient Problems


(1) STEMI (ST elevation myocardial infarction)


Current Visit: No   Status: Inactive   


Plan to address problem: 


Patient is status post emergency cardiac catheterization with primary 

angioplasty and stenting of the occluded infarct right coronary vessel.  

Echocardiogram shows residual left ventricular systolic ejection fraction of 45 

to 50%.





We will switch Brilinta to Plavix, to minimize bleeding risk, continue baby 

aspirin.  Continue other guideline directed medical therapy.








Subjective


Date of service: 09/24/22


Principal diagnosis: Acute inferior wall STEMI


Interval history: 





Patient is comfortable, no acute distress, no new cardiac complaints.  He has 

been noted over the past 24 hours to develop hematuria, and has been seen by 

urology.  His Aggrastat infusion was discontinued per protocol yesterday 

morning.  His dual antiplatelet therapy is baby aspirin and Brilinta.





Objective


                                   Vital Signs











  Temp Pulse Resp BP BP Pulse Ox


 


 09/24/22 08:05       98


 


 09/24/22 07:33  97.7 F  76  18  118/82   98


 


 09/24/22 03:47  98.0 F  75  18  118/81   97


 


 09/24/22 03:00       98


 


 09/23/22 23:57       96


 


 09/23/22 23:19  98.0 F  70  18  106/72   98


 


 09/23/22 21:06  98.2 F  78  18  112/71   96


 


 09/23/22 17:02   74    


 


 09/23/22 17:00  98.9 F  71  18   124/73  99


 


 09/23/22 16:40   71  11 L  98/58   91


 


 09/23/22 16:30   68  9 L  98/58   93


 


 09/23/22 16:20   67  13  98/58   93


 


 09/23/22 16:10   66  25 H  98/58   94


 


 09/23/22 16:00  98.9 F  68  16  98/58   92


 


 09/23/22 15:50   65  18  99/62   93


 


 09/23/22 15:40   74  13  99/62   95


 


 09/23/22 15:30   68  21  99/62   92


 


 09/23/22 15:20   72  20  99/62   95


 


 09/23/22 15:10   67  21    92


 


 09/23/22 15:09   69  22    94


 


 09/23/22 14:50   69  23  111/70   95


 


 09/23/22 14:40   69  23  111/70   96


 


 09/23/22 14:30   65  15  111/70   93


 


 09/23/22 14:20   67  16  111/70   96


 


 09/23/22 14:14     111/70   90


 


 09/23/22 14:00   67  14  93/52  


 


 09/23/22 13:50   68  13  87/57   95


 


 09/23/22 13:40   71  13  87/57   95














- Physical Examination


General: No Apparent Distress


HEENT: Positive: PERRL


Neck: Positive: neck supple


Cardiac: Positive: Reg Rate and Rhythm


Lungs: Positive: Decreased Breath Sounds


Neuro: Positive: Grossly Intact


Abdomen: Positive: Soft


Skin: Positive: Clear


Extremities: Absent: edema





- Labs and Meds


                                       CBC











  09/24/22 Range/Units





  04:30 


 


WBC  8.7  (4.5-11.0)  K/mm3


 


RBC  4.25  (3.65-5.03)  M/mm3


 


Hgb  13.8  (11.8-15.2)  gm/dl


 


Hct  40.0  (35.5-45.6)  %


 


Plt Count  121 L  (140-440)  K/mm3








                          Comprehensive Metabolic Panel











  09/24/22 Range/Units





  04:00 


 


Sodium  138  (137-145)  mmol/L


 


Potassium  4.6  (3.6-5.0)  mmol/L


 


Chloride  103.3  ()  mmol/L


 


Carbon Dioxide  23  (22-30)  mmol/L


 


BUN  15  (9-20)  mg/dL


 


Creatinine  0.7 L  (0.8-1.3)  mg/dL


 


Glucose  193 H  ()  mg/dL


 


Calcium  8.4  (8.4-10.2)  mg/dL














- Imaging and Cardiology


EKG: report reviewed (ST elevation MI in inferior leads.  Heart rate of 76)

## 2022-09-24 NOTE — CAT SCAN REPORT
CT ABDOMEN AND PELVIS WITHOUT CONTRAST



HISTORY: HEMATURIA WITH CLOTS. Patient was placed on blood thinners and began passing blood clots in 
the urine.



COMPARISON: None.



TECHNIQUE: CT images of the abdomen and pelvis were obtained without administration of intravenous co
ntrast.  All CT scans at this location are performed using CT dose reduction for ALARA by means of au
tomated exposure control. 



FINDINGS:



Lungs/bones:  Lung bases are clear. Degenerative changes in the spine and the pelvis with no acute os
seous abnormality.



Abdomen/pelvis:  There is hepatic steatosis and lobulated appearance of the liver suggesting cirrhoti
c morphology. A single gallstone is present with no inflammatory change. The spleen, pancreas, kidney
s, and proximal GI tract appear unremarkable. No renal stone disease, mass, cyst, or hydronephrosis. 
There is mild bilateral adrenal thickening.



Prostate is normal size. Urinary bladder is unremarkable for noncontrast technique. No stone or obvio
us hematoma identified. No mass. No pelvic free fluid. No acute colonic abnormality.





IMPRESSION:

1. No acute abnormality identified.

2. Incidental findings as above.



Signer Name: Ruddy Swanson MD 

Signed: 9/24/2022 10:13 AM

Workstation Name: [id number]

## 2022-09-25 VITALS — SYSTOLIC BLOOD PRESSURE: 164 MMHG | DIASTOLIC BLOOD PRESSURE: 100 MMHG

## 2022-09-25 LAB
BUN SERPL-MCNC: 10 MG/DL (ref 9–20)
BUN/CREAT SERPL: 20 %
CALCIUM SERPL-MCNC: 8.6 MG/DL (ref 8.4–10.2)
HCT VFR BLD CALC: 39.4 % (ref 35.5–45.6)
HEMOLYSIS INDEX: 11
HGB BLD-MCNC: 14.3 GM/DL (ref 11.8–15.2)
MCHC RBC AUTO-ENTMCNC: 36 % (ref 32–34)
MCV RBC AUTO: 92 FL (ref 84–94)
PLATELET # BLD: 117 K/MM3 (ref 140–440)
RBC # BLD AUTO: 4.27 M/MM3 (ref 3.65–5.03)

## 2022-09-25 RX ADMIN — SODIUM CHLORIDE FOR IRRIGATION SCH ML: 0.9 SOLUTION IRRIGATION at 04:00

## 2022-09-25 RX ADMIN — Medication SCH ML: at 10:32

## 2022-09-25 RX ADMIN — FAMOTIDINE SCH MG: 20 TABLET ORAL at 10:31

## 2022-09-25 RX ADMIN — SODIUM CHLORIDE FOR IRRIGATION SCH ML: 0.9 SOLUTION IRRIGATION at 00:00

## 2022-09-25 RX ADMIN — LISINOPRIL SCH MG: 5 TABLET ORAL at 10:32

## 2022-09-25 RX ADMIN — PHENAZOPYRIDINE SCH MG: 100 TABLET ORAL at 05:51

## 2022-09-25 RX ADMIN — ASPIRIN SCH MG: 81 TABLET, COATED ORAL at 10:32

## 2022-09-25 RX ADMIN — SODIUM CHLORIDE FOR IRRIGATION SCH ML: 0.9 SOLUTION IRRIGATION at 05:49

## 2022-09-25 RX ADMIN — INSULIN LISPRO SCH UNIT: 100 INJECTION, SOLUTION INTRAVENOUS; SUBCUTANEOUS at 10:32

## 2022-09-25 RX ADMIN — METOPROLOL TARTRATE SCH MG: 25 TABLET, FILM COATED ORAL at 10:31

## 2022-09-25 RX ADMIN — SODIUM CHLORIDE FOR IRRIGATION SCH ML: 0.9 SOLUTION IRRIGATION at 02:00

## 2022-09-25 NOTE — EVENT NOTE
Date: 09/25/22











SPOKE WITH NURSE


NO CLOTS OVERNIGHT





+ SPASM - - PYRIDIUM 200MG TID WILL HELP





OK TO DC HOME WITH MAN BIG BAG & LEG BAG


PLUG MAN SIDE PORT





WILL REMOVE MAN THIS TURSDAY OR WEDNESDAY IN OFFICE


--MY STAFF WILL CALL PT

## 2022-09-25 NOTE — DISCHARGE SUMMARY
Providers





- Providers


Date of Admission: 


09/22/22 17:48





Attending physician: 


YANELI FELIX MD





                                        





09/22/22


Consult to Cardiac Rehabilitation [CONS] Routine 


   Reason For Exam: post pci





09/22/22 18:13


Consult to Physician [CONS] Routine 


   Comment: 


   Consulting Provider: ANEL MARCUM


   Physician Instructions: 


   Reason For Exam: STEMI





09/24/22 09:09


Consult to Physician [CONS] Routine 


   Comment: 


   Consulting Provider: RADHIKA MEDEIROS


   Physician Instructions: 


   Reason For Exam: HEMATURIA











Primary care physician: 


PRIMARY CARE MD








Hospitalization


Condition: Stable


Hospital course: 


This is 57 year old male male with current nicotine abuse, etoh abuse who 

presented to the emergency department on 9/22 with severe retrosternal chest 

pain with radiation to the left arm associated with nausea and diaphoresis. In 

the emergency department patient lost consciousness and was taken to ED Main 

where an ECG was ran and showed STEMI.  On-call cardiologist was consulted and 

patient was taken to Cath Lab where he was found to have a complete occlusion of

 the right coronary artery in its proximal segment and he received primary 

angioplasty with stent placement.  Patient was admitted to the hospital service 

with consults to CCM to the ICU post PCI.





HE WAS admitted with STEMI s/p PCI and stent placement to RCA.  


Hospital course to date:





9/23: Patient completed Aggrastat drip, no chest pain reported.  Patient walked 

around the unit with RN.  GDMT decreased dosage due to to hypotension.  Patient 

continues on aspirin and Brilinta.  Echocardiogram pending.  Patient will be 

transferred to telemetry with remote telemetry.





9/24: Patient seen and examined this morning urinal seen large amount of red 

urine noted.  Discussed with cardiology will switch from Brilinta to Plavix.  

Aggrastat has also been stopped yesterday.  Anticipate that this will improve.  

Due to the noted or reported clots we will obtain a CT abdomen and pelvis for 

evaluation.  We will also consult urology for evaluation.  Plan discussed with 

the patient he verbalized understanding





9/25: Patient seen and examined no new distress.  Hua was placed yesterday by 

urology.  To discharge patient with Hua to leg bag and patient will follow-up 

with them Tuesday on Wednesday.  Medications were adjusted as recommended by 

cardiology patient will be discharged on aspirin and Plavix along with other 

goal-directed medical therapy.  Extensive counseling again provided to the 

patient on the importance of quitting alcohol and tobacco use resources also 

provided to the patient.  He verbalized understanding








Neuro: EtOH abuse (2 beers per day)


-WA protocol


-Reorientation as needed


-Maintain sleep-wake cycle


-As needed analgesia





Cardiac: STEMI s/p PCI with stent placement to RCA


-Cardiology consulted, appreciate recommendations


-S/p cardiac cath which revealed 100% occlusion of proximal RCA


-S/p stent placement to RCA


-Blood pressure monitoring per protocol


-Aspirin, Brilinta (discontinued and switched to Plavix), lisinopril, 

metoprolol, Lipitor


-Echocardiogram pending read


-Education on lifestyle modification including weight loss, tobacco abuse 

cessation, dietary modification





Respiratory: Nicotine abuse


-Patient admits smoking to 3 packs/day


-Supplemental oxygen as needed


-Currently on room air


-SPO2 monitor per protocol


-Pulmonary hygiene


-Continue cessation strongly encouraged





GI: Morbid obesity


Diabtes mellitus new diagnosis


- Counselling provided to the patient.


-Encourage lifestyle modifications outpatient


-Cardiac diet





: Hematuria


-Monitor intake and output


-Renally dose medications


-Avoid nephrotoxic medications


-Trend BMP


-We will obtain CT abdomen pelvis without contrast





ID: [id number]


-Monitor WBC and temperature curve





Endo: Hyperglycemia/morbidly obese


-Hemoglobin A1c


-Avoid hypoglycemia


-SSI


-Accu-Cheks q. ACHS


-50 minutes counseling provided to the patient on the importance of weight loss 

he verbalized understanding


Heme: Leukocytosis


-Trend CBC


-Transfuse hemoglobin less than 7


-SCDs to BLE while in bed











 























Disposition: 01 HOME / SELF CARE / HOMELESS


Final Discharge Diagnosis (Prints w/discharge instructions): STEMI s/p PCI with 

stent placement to RCA.  Nicotine abuse.  Morbid obesity.  EtOH abuse (2 beers 

per day).  Hematuria.  morbidly obese.  Diabetes mellitus-New diagnosis


Time spent for discharge: 35 mins





Core Measure Documentation





- Palliative Care


Palliative Care/ Comfort Measures: Not Applicable





- Core Measures


Any of the following diagnoses?: acute MI





- Acute MI Discharge Requirements


Aspirin at discharge: Yes


ACE/ARB for LVSD if EF <40%: Yes


Beta blocker at discharge: Yes


Statin for LDL = or >100 mg/dl on DC: Yes





Exam





- Physical Exam


Narrative exam: 


VITAL SIGNS:  Reviewed.    


GENERAL:  The patient appears normally developed, morbidly obese vital signs as 

documented.


HEAD:  No signs of head trauma.


EYES:  Pupils are equal.  Extraocular motions intact.  


EARS:  Hearing grossly intact.


MOUTH:  Oropharynx is normal. 


NECK:  No adenopathy, no JVD.  


CHEST:  Chest with clear breath sounds bilaterally.  No wheezes, rales, or 

rhonchi.  


CARDIAC:  Regular rate and rhythm.  S1 and S2, without murmurs, gallops, or 

rubs.


VASCULAR:  No Edema.  Peripheral pulses normal and equal in all extremities.


ABDOMEN:  Soft, non tender and non distended.  No   rebound or guarding, and no 

masses palpated.   Bowel Sounds normal.


MUSCULOSKELETAL:  Good range of motion of all major joints. Extremities without 

clubbing, cyanosis or edema.  


NEUROLOGIC EXAM:  Alert and oriented x 3   No focal sensory or strength 

deficits.   Speech normal.  Follows commands.


PSYCHIATRIC:  Mood normal.


SKIN:  detail exam as documented in skin assessment








- Constitutional


Vitals: 


                                        











Temp Pulse Resp BP Pulse Ox


 


 98.0 F   69   20   128/87   96 


 


 09/25/22 03:56  09/25/22 03:56  09/25/22 03:56  09/25/22 03:56  09/25/22 03:56














Plan


Activity: advance as tolerated, fall precautions


Diet: low fat


Special Instructions: record daily weights, record daily BP diary, smoking 

cessation, other (EtOH cessation)


Follow up with: 


PRIMARY CARE,MD [Primary Care Provider] - 7 Days


RADHIKA MEDEIROS MD [Staff Physician] - 09/27/22


ANEL MARCUM MD [Staff Physician] - 7 Days


[facility] [Referring] - 7 Days


Prescriptions: 


AtorvaSTATin [Lipitor] 80 mg PO QHS #60 tablet


metFORMIN [Glucophage] 500 mg PO BID #60 tab


Aspirin EC [Halfprin EC] 81 mg PO QDAY #30 tablet


Metoprolol [Lopressor TAB] 25 mg PO BID #60 tablet


Famotidine [Pepcid] 20 mg PO BID #30 tablet


Clopidogrel [Plavix] 75 mg PO QDAY #30 tablet


Phenazopyridine [Pyridium] 200 mg PO Q8HR #30 tablet


lisinopriL [Zestril TAB] 5 mg PO QDAY #30 tablet


Other Discharge Orders: 


Glucometer  (Amb) Location: None Selected


Glucometer supplies[Amb] Location: None Selected

## 2025-05-26 NOTE — ELECTROCARDIOGRAPH REPORT
[facility]

                                       

Test Date:    2022               Test Time:    16:59:07

Pat Name:     ALFRED DANIEL            Department:   

Patient ID:   [id number]          Room:         A254 1

Gender:       M                        Technician:   MOLLY

:          1965               Requested By: АННА ROWELL

Order Number: [id number]             Reading MD:   Erasmo Carreno

                                 Measurements

Intervals                              Axis          

Rate:         59                       P:            0

TN:                                    QRS:          91

QRSD:         85                       T:            91

QT:           401                                    

QTc:          397                                    

                           Interpretive Statements

AV block, complete (third degree)

Inferoposterior infarct, acute

Lateral infarct, acute

No previous ECG available for comparison

Electronically Signed On 2022 10:32:39 EDT by Erasmo Carreno
[facility]

                                       

Test Date:    2022               Test Time:    07:02:06

Pat Name:     ALFRED DANIEL            Department:   

Patient ID:   [id number]          Room:         A254 1

Gender:       M                        Technician:   SALAZAR

:          1965               Requested By: ANEL MARCUM

Order Number: [id number]             Reading MD:   Erasmo Carreno

                                 Measurements

Intervals                              Axis          

Rate:         64                       P:            56

CA:           195                      QRS:          -41

QRSD:         130                      T:            35

QT:           444                                    

QTc:          458                                    

                           Interpretive Statements

Sinus rhythm

Right bundle branch block

Inferior infarct, recent

Compared to ECG 2022 16:59:07

Right bundle-branch block now present

Myocardial infarct finding still present

Electronically Signed On 2022 10:34:11 EDT by Erasmo Carreno
pt presenting to ED c/o spitting up blood x1 hour. denies  n/v, on warfarin